# Patient Record
Sex: FEMALE | Race: WHITE | NOT HISPANIC OR LATINO | Employment: OTHER | URBAN - METROPOLITAN AREA
[De-identification: names, ages, dates, MRNs, and addresses within clinical notes are randomized per-mention and may not be internally consistent; named-entity substitution may affect disease eponyms.]

---

## 2019-03-04 ENCOUNTER — HISTORICAL (OUTPATIENT)
Dept: LAB | Facility: HOSPITAL | Age: 74
End: 2019-03-04

## 2019-03-04 LAB
ABS NEUT (OLG): 5.29 X10(3)/MCL (ref 2.1–9.2)
ALBUMIN SERPL-MCNC: 3.5 GM/DL (ref 3.4–5)
ALBUMIN/GLOB SERPL: 1.2 RATIO (ref 1.1–2)
ALP SERPL-CCNC: 94 UNIT/L (ref 38–126)
ALT SERPL-CCNC: 30 UNIT/L (ref 12–78)
AST SERPL-CCNC: 18 UNIT/L (ref 15–37)
BASOPHILS # BLD AUTO: 0 X10(3)/MCL (ref 0–0.2)
BASOPHILS NFR BLD AUTO: 0 %
BILIRUB SERPL-MCNC: 0.7 MG/DL (ref 0.2–1)
BILIRUBIN DIRECT+TOT PNL SERPL-MCNC: 0.1 MG/DL (ref 0–0.5)
BILIRUBIN DIRECT+TOT PNL SERPL-MCNC: 0.6 MG/DL (ref 0–0.8)
BUN SERPL-MCNC: 14 MG/DL (ref 7–18)
CALCIUM SERPL-MCNC: 9.1 MG/DL (ref 8.5–10.1)
CHLORIDE SERPL-SCNC: 108 MMOL/L (ref 98–107)
CHOLEST SERPL-MCNC: 183 MG/DL (ref 0–200)
CHOLEST/HDLC SERPL: 3.8 {RATIO} (ref 0–4)
CO2 SERPL-SCNC: 28 MMOL/L (ref 21–32)
CREAT SERPL-MCNC: 0.78 MG/DL (ref 0.55–1.02)
CREAT UR-MCNC: 115 MG/DL
DEPRECATED CALCIDIOL+CALCIFEROL SERPL-MC: 42 NG/ML (ref 30–80)
EOSINOPHIL # BLD AUTO: 0.4 X10(3)/MCL (ref 0–0.9)
EOSINOPHIL NFR BLD AUTO: 4 %
ERYTHROCYTE [DISTWIDTH] IN BLOOD BY AUTOMATED COUNT: 15.7 % (ref 11.5–17)
EST. AVERAGE GLUCOSE BLD GHB EST-MCNC: 137 MG/DL
GLOBULIN SER-MCNC: 3 GM/DL (ref 2.4–3.5)
GLUCOSE SERPL-MCNC: 119 MG/DL (ref 74–106)
HBA1C MFR BLD: 6.4 % (ref 4.2–6.3)
HCT VFR BLD AUTO: 43.8 % (ref 37–47)
HDLC SERPL-MCNC: 48 MG/DL (ref 35–60)
HGB BLD-MCNC: 13.3 GM/DL (ref 12–16)
LDLC SERPL CALC-MCNC: 109 MG/DL (ref 0–129)
LYMPHOCYTES # BLD AUTO: 1.9 X10(3)/MCL (ref 0.6–4.6)
LYMPHOCYTES NFR BLD AUTO: 23 %
MCH RBC QN AUTO: 28.6 PG (ref 27–31)
MCHC RBC AUTO-ENTMCNC: 30.4 GM/DL (ref 33–36)
MCV RBC AUTO: 94.2 FL (ref 80–94)
MICROALBUMIN UR-MCNC: 2.2 MG/DL
MICROALBUMIN/CREAT RATIO PNL UR: 19.1 MG/GM CR (ref 0–30)
MONOCYTES # BLD AUTO: 0.6 X10(3)/MCL (ref 0.1–1.3)
MONOCYTES NFR BLD AUTO: 7 %
NEUTROPHILS # BLD AUTO: 5.29 X10(3)/MCL (ref 2.1–9.2)
NEUTROPHILS NFR BLD AUTO: 65 %
PLATELET # BLD AUTO: 262 X10(3)/MCL (ref 130–400)
PMV BLD AUTO: 11.8 FL (ref 9.4–12.4)
POTASSIUM SERPL-SCNC: 3.9 MMOL/L (ref 3.5–5.1)
PROT SERPL-MCNC: 6.5 GM/DL (ref 6.4–8.2)
RBC # BLD AUTO: 4.65 X10(6)/MCL (ref 4.2–5.4)
SODIUM SERPL-SCNC: 143 MMOL/L (ref 136–145)
TRIGL SERPL-MCNC: 130 MG/DL (ref 30–150)
TSH SERPL-ACNC: 0.57 MIU/L (ref 0.36–3.74)
VLDLC SERPL CALC-MCNC: 26 MG/DL
WBC # SPEC AUTO: 8.2 X10(3)/MCL (ref 4.5–11.5)

## 2019-05-10 ENCOUNTER — HISTORICAL (OUTPATIENT)
Dept: RADIOLOGY | Facility: HOSPITAL | Age: 74
End: 2019-05-10

## 2019-06-03 ENCOUNTER — HISTORICAL (OUTPATIENT)
Dept: LAB | Facility: HOSPITAL | Age: 74
End: 2019-06-03

## 2019-06-03 LAB
ALBUMIN SERPL-MCNC: 3.7 GM/DL (ref 3.4–5)
ALBUMIN/GLOB SERPL: 1.2 {RATIO}
ALP SERPL-CCNC: 80 UNIT/L (ref 45–117)
ALT SERPL-CCNC: 16 UNIT/L (ref 13–56)
AST SERPL-CCNC: 12 UNIT/L (ref 15–37)
BILIRUB SERPL-MCNC: 0.5 MG/DL (ref 0.2–1)
BILIRUBIN DIRECT+TOT PNL SERPL-MCNC: 0.14 MG/DL (ref 0–0.2)
BILIRUBIN DIRECT+TOT PNL SERPL-MCNC: 0.36 MG/DL (ref 0–1)
BUN SERPL-MCNC: 14 MG/DL (ref 7–18)
CALCIUM SERPL-MCNC: 8.8 MG/DL (ref 8.5–10.1)
CHLORIDE SERPL-SCNC: 109 MMOL/L (ref 98–107)
CO2 SERPL-SCNC: 26 MMOL/L (ref 21–32)
CREAT SERPL-MCNC: 0.84 MG/DL (ref 0.55–1.02)
CREAT UR-MCNC: 172 MG/DL
EST. AVERAGE GLUCOSE BLD GHB EST-MCNC: 120 MG/DL
GLOBULIN SER-MCNC: 3 GM/DL (ref 2–4)
GLUCOSE SERPL-MCNC: 109 MG/DL (ref 74–106)
HBA1C MFR BLD: 5.8 % (ref 4.2–6.3)
MICROALBUMIN UR-MCNC: 1 MG/DL
MICROALBUMIN/CREAT RATIO PNL UR: 5.8 MG/GM CR (ref 0–30)
POTASSIUM SERPL-SCNC: 3.9 MMOL/L (ref 3.5–5.1)
PROT SERPL-MCNC: 6.7 GM/DL (ref 6.4–8.2)
SODIUM SERPL-SCNC: 143 MMOL/L (ref 136–145)
TSH SERPL-ACNC: 0.11 MIU/ML (ref 0.36–3.74)

## 2019-10-28 ENCOUNTER — HISTORICAL (OUTPATIENT)
Dept: LAB | Facility: HOSPITAL | Age: 74
End: 2019-10-28

## 2019-10-28 LAB
ABS NEUT (OLG): 5.63 X10(3)/MCL (ref 2.1–9.2)
ALBUMIN SERPL-MCNC: 3.8 GM/DL (ref 3.4–5)
ALBUMIN/GLOB SERPL: 1.2 RATIO (ref 1.1–2)
ALP SERPL-CCNC: 94 UNIT/L (ref 38–126)
ALT SERPL-CCNC: 15 UNIT/L (ref 12–78)
AST SERPL-CCNC: 14 UNIT/L (ref 15–37)
BASOPHILS # BLD AUTO: 0 X10(3)/MCL (ref 0–0.2)
BASOPHILS NFR BLD AUTO: 0 %
BILIRUB SERPL-MCNC: 0.6 MG/DL (ref 0.2–1)
BILIRUBIN DIRECT+TOT PNL SERPL-MCNC: 0.2 MG/DL (ref 0–0.5)
BILIRUBIN DIRECT+TOT PNL SERPL-MCNC: 0.4 MG/DL (ref 0–0.8)
BUN SERPL-MCNC: 13 MG/DL (ref 7–18)
CALCIUM SERPL-MCNC: 9.7 MG/DL (ref 8.5–10.1)
CHLORIDE SERPL-SCNC: 105 MMOL/L (ref 98–107)
CO2 SERPL-SCNC: 28 MMOL/L (ref 21–32)
CREAT SERPL-MCNC: 0.9 MG/DL (ref 0.55–1.02)
CREAT UR-MCNC: 130 MG/DL
DEPRECATED CALCIDIOL+CALCIFEROL SERPL-MC: 31.28 NG/ML (ref 30–80)
EOSINOPHIL # BLD AUTO: 0.4 X10(3)/MCL (ref 0–0.9)
EOSINOPHIL NFR BLD AUTO: 4 %
ERYTHROCYTE [DISTWIDTH] IN BLOOD BY AUTOMATED COUNT: 16.5 % (ref 11.5–17)
EST. AVERAGE GLUCOSE BLD GHB EST-MCNC: 128 MG/DL
GLOBULIN SER-MCNC: 3.2 GM/DL (ref 2.4–3.5)
GLUCOSE SERPL-MCNC: 127 MG/DL (ref 74–106)
HBA1C MFR BLD: 6.1 % (ref 4.2–6.3)
HCT VFR BLD AUTO: 48.2 % (ref 37–47)
HGB BLD-MCNC: 15 GM/DL (ref 12–16)
LYMPHOCYTES # BLD AUTO: 1.9 X10(3)/MCL (ref 0.6–4.6)
LYMPHOCYTES NFR BLD AUTO: 22 %
MCH RBC QN AUTO: 29 PG (ref 27–31)
MCHC RBC AUTO-ENTMCNC: 31.1 GM/DL (ref 33–36)
MCV RBC AUTO: 93.2 FL (ref 80–94)
MICROALBUMIN UR-MCNC: 1.1 MG/DL
MICROALBUMIN/CREAT RATIO PNL UR: 8.5 MG/GM CR (ref 0–30)
MONOCYTES # BLD AUTO: 0.7 X10(3)/MCL (ref 0.1–1.3)
MONOCYTES NFR BLD AUTO: 8 %
NEUTROPHILS # BLD AUTO: 5.63 X10(3)/MCL (ref 2.1–9.2)
NEUTROPHILS NFR BLD AUTO: 65 %
PLATELET # BLD AUTO: 304 X10(3)/MCL (ref 130–400)
PMV BLD AUTO: 11.9 FL (ref 9.4–12.4)
POTASSIUM SERPL-SCNC: 3.9 MMOL/L (ref 3.5–5.1)
PROT SERPL-MCNC: 7 GM/DL (ref 6.4–8.2)
RBC # BLD AUTO: 5.17 X10(6)/MCL (ref 4.2–5.4)
SODIUM SERPL-SCNC: 141 MMOL/L (ref 136–145)
T4 FREE SERPL-MCNC: 1.12 NG/DL (ref 0.76–1.46)
TSH SERPL-ACNC: 1.78 MIU/L (ref 0.36–3.74)
WBC # SPEC AUTO: 8.7 X10(3)/MCL (ref 4.5–11.5)

## 2019-11-21 ENCOUNTER — HISTORICAL (OUTPATIENT)
Dept: INFUSION THERAPY | Facility: HOSPITAL | Age: 74
End: 2019-11-21

## 2020-02-10 ENCOUNTER — HISTORICAL (OUTPATIENT)
Dept: LAB | Facility: HOSPITAL | Age: 75
End: 2020-02-10

## 2020-04-08 ENCOUNTER — HISTORICAL (OUTPATIENT)
Dept: LAB | Facility: HOSPITAL | Age: 75
End: 2020-04-08

## 2020-07-13 ENCOUNTER — NURSE TRIAGE (OUTPATIENT)
Dept: ADMINISTRATIVE | Facility: CLINIC | Age: 75
End: 2020-07-13

## 2020-07-13 NOTE — TELEPHONE ENCOUNTER
Pt's  states pt had rectal bleeding noted with BM Saturday that has now resolved.  states blood was noted in the stool.  denies bleeding turning water red.  advised per protocol and  verbalizes understanding.     Care Advice Given     Given By Given At Modified    Aditi Enrique RN 7/13/2020  7:47 AM No    GO TO ED/UCC NOW (OR TO OFFICE WITH PCP APPROVAL):                          * After using nurse judgment regarding the most appropriate site, tell the caller:  Go to ED.  Leave NOW.    * TRIAGER CAUTION: In selecting the most appropriate care site, you must consider both the severity of the patient's symptoms AND what resources are available at that care site.  * ED: Patients who may need surgery, sound seriously ill or may be unstable need to be sent to an ED. Likewise, so do most patients with complex medical problems and serious symptoms.  * UCC:  Some UCCs can manage patients who are stable and have less serious symptoms (e.g., minor illnesses and injuries). The triager must know the UCC capabilities before sending a patient there. If unsure, call ahead.  * OFFICE: If patient sounds stable and not seriously ill, consult PCP (or follow your office policy) to see if patient can be seen NOW in office.    Aditi Enrique RN 7/13/2020  7:49 AM No    CALL BACK IF:  * Bleeding increases in amount  * Bleeding occurs 3 or more times after treatment begins  * You become worse.      Disposition     Go to ED Now (or to Office With PCP Approval)         What would patient/caregiver have done without intervention? Would have attempted to contact the Provider    Patient/Caregiver understands and will follow disposition? Unsure      Protocols (Most recently selected listed first)     Name Status    RECTAL BLEEDING-A-OH Used            Reason for Disposition   Pale skin (pallor) of new onset or worsening    Additional Information   Negative: Passed out (i.e., fainted, collapsed  and was not responding)   Negative: Shock suspected (e.g., cold/pale/clammy skin, too weak to stand, low BP, rapid pulse)   Negative: Vomiting red blood or black (coffee ground) material   Negative: Sounds like a life-threatening emergency to the triager   Negative: SEVERE rectal bleeding (large blood clots; on and off, or constant bleeding)   Negative: SEVERE dizziness (e.g., unable to stand, requires support to walk, feels like passing out now)   Negative: MODERATE rectal bleeding (small blood clots, passing blood without stool, or toilet water turns red) more than once a day   Negative: Bloody, black, or tarry bowel movements   Negative: High-risk adult (e.g., prior surgery on aorta, abdominal aortic aneurysm)   Negative: Rectal foreign body (inserted or swallowed)   Negative: SEVERE abdominal pain (e.g., excruciating)   Negative: Constant abdominal pain lasting > 2 hours    Protocols used: RECTAL BLEEDING-A-OH

## 2020-08-18 ENCOUNTER — HISTORICAL (OUTPATIENT)
Dept: INFUSION THERAPY | Facility: HOSPITAL | Age: 75
End: 2020-08-18

## 2020-11-10 ENCOUNTER — HISTORICAL (OUTPATIENT)
Dept: ADMINISTRATIVE | Facility: HOSPITAL | Age: 75
End: 2020-11-10

## 2020-11-10 LAB
ABS NEUT (OLG): 4.58 X10(3)/MCL (ref 2.1–9.2)
BASOPHILS # BLD AUTO: 0 X10(3)/MCL (ref 0–0.2)
BASOPHILS NFR BLD AUTO: 0 %
EOSINOPHIL # BLD AUTO: 0.1 X10(3)/MCL (ref 0–0.9)
EOSINOPHIL NFR BLD AUTO: 2 %
ERYTHROCYTE [DISTWIDTH] IN BLOOD BY AUTOMATED COUNT: 14 % (ref 11.5–17)
HCT VFR BLD AUTO: 44.5 % (ref 37–47)
HGB BLD-MCNC: 14 GM/DL (ref 12–16)
LYMPHOCYTES # BLD AUTO: 1.6 X10(3)/MCL (ref 0.6–4.6)
LYMPHOCYTES NFR BLD AUTO: 24 %
MCH RBC QN AUTO: 31.5 PG (ref 27–31)
MCHC RBC AUTO-ENTMCNC: 31.5 GM/DL (ref 33–36)
MCV RBC AUTO: 100.2 FL (ref 80–94)
MONOCYTES # BLD AUTO: 0.5 X10(3)/MCL (ref 0.1–1.3)
MONOCYTES NFR BLD AUTO: 7 %
NEUTROPHILS # BLD AUTO: 4.58 X10(3)/MCL (ref 2.1–9.2)
NEUTROPHILS NFR BLD AUTO: 67 %
PLATELET # BLD AUTO: 233 X10(3)/MCL (ref 130–400)
PMV BLD AUTO: 12.6 FL (ref 9.4–12.4)
RBC # BLD AUTO: 4.44 X10(6)/MCL (ref 4.2–5.4)
T3FREE SERPL-MCNC: 2.62 PG/ML (ref 1.71–3.71)
T4 FREE SERPL-MCNC: 1.05 NG/DL (ref 0.7–1.48)
TSH SERPL-ACNC: 0.43 UIU/ML (ref 0.35–4.94)
WBC # SPEC AUTO: 6.9 X10(3)/MCL (ref 4.5–11.5)

## 2020-11-13 ENCOUNTER — HISTORICAL (OUTPATIENT)
Dept: ADMINISTRATIVE | Facility: HOSPITAL | Age: 75
End: 2020-11-13

## 2020-11-13 LAB
APPEARANCE, UA: CLEAR
BACTERIA SPEC CULT: ABNORMAL /HPF
BILIRUB UR QL STRIP: NEGATIVE
COLOR UR: YELLOW
GLUCOSE (UA): NEGATIVE
HGB UR QL STRIP: NEGATIVE
KETONES UR QL STRIP: NEGATIVE
LEUKOCYTE ESTERASE UR QL STRIP: ABNORMAL
NITRITE UR QL STRIP: NEGATIVE
PH UR STRIP: 5.5 [PH] (ref 5–9)
PROT UR QL STRIP: NEGATIVE
RBC #/AREA URNS HPF: ABNORMAL /[HPF]
SP GR UR STRIP: 1.02 (ref 1–1.03)
SQUAMOUS EPITHELIAL, UA: ABNORMAL
UROBILINOGEN UR STRIP-ACNC: 1
WBC #/AREA URNS HPF: 6 /HPF (ref 0–3)

## 2021-02-19 ENCOUNTER — HISTORICAL (OUTPATIENT)
Dept: INFUSION THERAPY | Facility: HOSPITAL | Age: 76
End: 2021-02-19

## 2021-03-23 ENCOUNTER — HISTORICAL (OUTPATIENT)
Dept: LAB | Facility: HOSPITAL | Age: 76
End: 2021-03-23

## 2021-03-23 LAB
ABS NEUT (OLG): 4.52 X10(3)/MCL (ref 2.1–9.2)
ALBUMIN SERPL-MCNC: 3.7 GM/DL (ref 3.4–4.8)
ALBUMIN/GLOB SERPL: 1.5 RATIO (ref 1.1–2)
ALP SERPL-CCNC: 49 UNIT/L (ref 40–150)
ALT SERPL-CCNC: 17 UNIT/L (ref 0–55)
AST SERPL-CCNC: 15 UNIT/L (ref 5–34)
BASOPHILS # BLD AUTO: 0.05 X10(3)/MCL (ref 0–0.2)
BASOPHILS NFR BLD AUTO: 0.6 % (ref 0–1)
BILIRUB SERPL-MCNC: 0.7 MG/DL (ref 0.2–1.2)
BILIRUBIN DIRECT+TOT PNL SERPL-MCNC: 0.3 MG/DL (ref 0–0.5)
BILIRUBIN DIRECT+TOT PNL SERPL-MCNC: 0.4 MG/DL (ref 0–0.8)
BUN SERPL-MCNC: 16.2 MG/DL (ref 9.8–20.1)
CALCIUM SERPL-MCNC: 8.6 MG/DL (ref 8.4–10.2)
CHLORIDE SERPL-SCNC: 108 MMOL/L (ref 98–107)
CHOLEST SERPL-MCNC: 136 MG/DL
CHOLEST/HDLC SERPL: 3 {RATIO} (ref 0–5)
CO2 SERPL-SCNC: 25 MMOL/L (ref 23–31)
CREAT SERPL-MCNC: 0.78 MG/DL (ref 0.57–1.11)
EOSINOPHIL # BLD AUTO: 0.32 X10(3)/MCL (ref 0–0.9)
EOSINOPHIL NFR BLD AUTO: 3.9 % (ref 0–6.4)
ERYTHROCYTE [DISTWIDTH] IN BLOOD BY AUTOMATED COUNT: 14.8 % (ref 11.5–17)
EST. AVERAGE GLUCOSE BLD GHB EST-MCNC: 122.6 MG/DL
GLOBULIN SER-MCNC: 2.5 GM/DL (ref 2.4–3.5)
GLUCOSE SERPL-MCNC: 122 MG/DL (ref 82–115)
HBA1C MFR BLD: 5.9 %
HCT VFR BLD AUTO: 41.5 % (ref 37–47)
HDLC SERPL-MCNC: 39 MG/DL (ref 40–60)
HGB BLD-MCNC: 13.2 GM/DL (ref 12–16)
IMM GRANULOCYTES # BLD AUTO: 0.03 10*3/UL (ref 0–0.02)
IMM GRANULOCYTES NFR BLD AUTO: 0.4 % (ref 0–0.43)
LDLC SERPL CALC-MCNC: 84 MG/DL (ref 50–140)
LYMPHOCYTES # BLD AUTO: 2.59 X10(3)/MCL (ref 0.6–4.6)
LYMPHOCYTES NFR BLD AUTO: 31.8 % (ref 16–44)
MCH RBC QN AUTO: 28.8 PG (ref 27–31)
MCHC RBC AUTO-ENTMCNC: 31.8 GM/DL (ref 33–36)
MCV RBC AUTO: 90.6 FL (ref 80–94)
MONOCYTES # BLD AUTO: 0.64 X10(3)/MCL (ref 0.1–1.3)
MONOCYTES NFR BLD AUTO: 7.9 % (ref 4–12.1)
NEUTROPHILS # BLD AUTO: 4.52 X10(3)/MCL (ref 2.1–9.2)
NEUTROPHILS NFR BLD AUTO: 55.4 % (ref 43–73)
NRBC BLD AUTO-RTO: 0 % (ref 0–0.2)
PLATELET # BLD AUTO: 243 X10(3)/MCL (ref 130–400)
PMV BLD AUTO: 12.2 FL (ref 7.4–10.4)
POTASSIUM SERPL-SCNC: 3.4 MMOL/L (ref 3.5–5.1)
PROT SERPL-MCNC: 6.2 GM/DL (ref 5.8–7.6)
RBC # BLD AUTO: 4.58 X10(6)/MCL (ref 4.2–5.4)
SODIUM SERPL-SCNC: 142 MMOL/L (ref 136–145)
TRIGL SERPL-MCNC: 63 MG/DL (ref 0–150)
TSH SERPL-ACNC: 0.05 UIU/ML (ref 0.35–4.94)
VLDLC SERPL CALC-MCNC: 13 MG/DL
WBC # SPEC AUTO: 8.2 X10(3)/MCL (ref 4.5–11.5)

## 2021-03-24 LAB
CREAT UR-MCNC: 125.4 MG/DL (ref 45–106)
MICROALBUMIN UR-MCNC: <5 UG/ML
MICROALBUMIN/CREAT RATIO PNL UR: <4 MG/GM CR (ref 0–30)

## 2021-07-01 LAB
LEFT EYE DM RETINOPATHY: NEGATIVE
RIGHT EYE DM RETINOPATHY: NEGATIVE

## 2021-10-16 ENCOUNTER — HISTORICAL (OUTPATIENT)
Dept: LAB | Facility: HOSPITAL | Age: 76
End: 2021-10-16

## 2021-10-16 LAB
ABS NEUT (OLG): 4.87 X10(3)/MCL (ref 2.1–9.2)
ALBUMIN SERPL-MCNC: 3.7 GM/DL (ref 3.4–4.8)
ALBUMIN/GLOB SERPL: 1.4 RATIO (ref 1.1–2)
ALP SERPL-CCNC: 56 UNIT/L (ref 40–150)
ALT SERPL-CCNC: 15 UNIT/L (ref 0–55)
AST SERPL-CCNC: 17 UNIT/L (ref 5–34)
BASOPHILS # BLD AUTO: 0.03 X10(3)/MCL (ref 0–0.2)
BASOPHILS NFR BLD AUTO: 0.4 % (ref 0–1)
BILIRUB SERPL-MCNC: 0.9 MG/DL (ref 0.2–1.2)
BILIRUBIN DIRECT+TOT PNL SERPL-MCNC: 0.3 MG/DL (ref 0–0.5)
BILIRUBIN DIRECT+TOT PNL SERPL-MCNC: 0.6 MG/DL (ref 0–0.8)
BUN SERPL-MCNC: 9 MG/DL (ref 9.8–20.1)
CALCIUM SERPL-MCNC: 9.4 MG/DL (ref 8.4–10.2)
CHLORIDE SERPL-SCNC: 106 MMOL/L (ref 98–107)
CHOLEST SERPL-MCNC: 138 MG/DL
CHOLEST/HDLC SERPL: 3 {RATIO} (ref 0–5)
CO2 SERPL-SCNC: 26 MMOL/L (ref 23–31)
CREAT SERPL-MCNC: 0.74 MG/DL (ref 0.57–1.11)
CREAT UR-MCNC: 94.5 MG/DL (ref 45–106)
EOSINOPHIL # BLD AUTO: 0.22 X10(3)/MCL (ref 0–0.9)
EOSINOPHIL NFR BLD AUTO: 2.9 % (ref 0–6.4)
ERYTHROCYTE [DISTWIDTH] IN BLOOD BY AUTOMATED COUNT: 16.9 % (ref 11.5–17)
EST. AVERAGE GLUCOSE BLD GHB EST-MCNC: 116.9 MG/DL
GLOBULIN SER-MCNC: 2.7 GM/DL (ref 2.4–3.5)
GLUCOSE SERPL-MCNC: 129 MG/DL (ref 82–115)
HBA1C MFR BLD: 5.7 %
HCT VFR BLD AUTO: 43.3 % (ref 37–47)
HDLC SERPL-MCNC: 42 MG/DL (ref 40–60)
HGB BLD-MCNC: 13.6 GM/DL (ref 12–16)
IMM GRANULOCYTES # BLD AUTO: 0.03 10*3/UL (ref 0–0.02)
IMM GRANULOCYTES NFR BLD AUTO: 0.4 % (ref 0–0.43)
LDLC SERPL CALC-MCNC: 80 MG/DL (ref 50–140)
LYMPHOCYTES # BLD AUTO: 1.93 X10(3)/MCL (ref 0.6–4.6)
LYMPHOCYTES NFR BLD AUTO: 25.3 % (ref 16–44)
MCH RBC QN AUTO: 28.6 PG (ref 27–31)
MCHC RBC AUTO-ENTMCNC: 31.4 GM/DL (ref 33–36)
MCV RBC AUTO: 91 FL (ref 80–94)
MICROALBUMIN UR-MCNC: 9 UG/ML
MICROALBUMIN/CREAT RATIO PNL UR: 9.5 MG/GM CR (ref 0–30)
MONOCYTES # BLD AUTO: 0.54 X10(3)/MCL (ref 0.1–1.3)
MONOCYTES NFR BLD AUTO: 7.1 % (ref 4–12.1)
NEUTROPHILS # BLD AUTO: 4.87 X10(3)/MCL (ref 2.1–9.2)
NEUTROPHILS NFR BLD AUTO: 63.9 % (ref 43–73)
NRBC BLD AUTO-RTO: 0 % (ref 0–0.2)
PLATELET # BLD AUTO: 224 X10(3)/MCL (ref 130–400)
PMV BLD AUTO: 12.3 FL (ref 7.4–10.4)
POTASSIUM SERPL-SCNC: 4.2 MMOL/L (ref 3.5–5.1)
PROT SERPL-MCNC: 6.4 GM/DL (ref 5.8–7.6)
RBC # BLD AUTO: 4.76 X10(6)/MCL (ref 4.2–5.4)
SODIUM SERPL-SCNC: 143 MMOL/L (ref 136–145)
TRIGL SERPL-MCNC: 78 MG/DL (ref 0–150)
TSH SERPL-ACNC: 0.06 UIU/ML (ref 0.35–4.94)
VLDLC SERPL CALC-MCNC: 16 MG/DL
WBC # SPEC AUTO: 7.6 X10(3)/MCL (ref 4.5–11.5)

## 2021-10-25 ENCOUNTER — HISTORICAL (OUTPATIENT)
Dept: RADIOLOGY | Facility: HOSPITAL | Age: 76
End: 2021-10-25

## 2022-04-05 DIAGNOSIS — M81.0 AGE-RELATED OSTEOPOROSIS WITHOUT CURRENT PATHOLOGICAL FRACTURE: ICD-10-CM

## 2022-04-09 ENCOUNTER — HISTORICAL (OUTPATIENT)
Dept: ADMINISTRATIVE | Facility: HOSPITAL | Age: 77
End: 2022-04-09
Payer: MEDICARE

## 2022-04-26 VITALS
HEIGHT: 61 IN | BODY MASS INDEX: 31.51 KG/M2 | WEIGHT: 166.88 LBS | OXYGEN SATURATION: 98 % | DIASTOLIC BLOOD PRESSURE: 73 MMHG | SYSTOLIC BLOOD PRESSURE: 117 MMHG

## 2022-05-17 NOTE — TELEPHONE ENCOUNTER
Type:  RX Refill Request    Who Called: pt   Refill or New Rx:refill  RX Name and Strength:wellburtion 150mg  How is the patient currently taking it? (ex. 1XDay):1xday  Is this a 30 day or 90 day RX:90day  Preferred Pharmacy with phone number:[facility]  Local or Mail Order:local  Ordering Provider:na  Would the patient rather a call back or a response via [facility]? Call back  Best Call Back Number:[phone]  Additional Information:

## 2022-05-19 RX ORDER — BUPROPION HYDROCHLORIDE 150 MG/1
150 TABLET, EXTENDED RELEASE ORAL 2 TIMES DAILY
COMMUNITY
End: 2022-05-19 | Stop reason: SDUPTHER

## 2022-05-19 RX ORDER — BUPROPION HYDROCHLORIDE 150 MG/1
150 TABLET, EXTENDED RELEASE ORAL DAILY
Qty: 90 TABLET | Refills: 0 | Status: SHIPPED | OUTPATIENT
Start: 2022-05-19 | End: 2022-10-06

## 2022-09-29 ENCOUNTER — TELEPHONE (OUTPATIENT)
Dept: FAMILY MEDICINE | Facility: CLINIC | Age: 77
End: 2022-09-29
Payer: MEDICARE

## 2022-09-29 DIAGNOSIS — E11.65 TYPE 2 DIABETES MELLITUS WITH HYPERGLYCEMIA, WITHOUT LONG-TERM CURRENT USE OF INSULIN: ICD-10-CM

## 2022-09-29 DIAGNOSIS — Z00.00 WELLNESS EXAMINATION: Primary | ICD-10-CM

## 2022-09-29 DIAGNOSIS — Z11.59 ENCOUNTER FOR HEPATITIS C SCREENING TEST FOR LOW RISK PATIENT: ICD-10-CM

## 2022-09-29 DIAGNOSIS — E03.9 HYPOTHYROIDISM, UNSPECIFIED TYPE: ICD-10-CM

## 2022-09-29 DIAGNOSIS — E78.5 HYPERLIPIDEMIA, UNSPECIFIED HYPERLIPIDEMIA TYPE: ICD-10-CM

## 2022-09-29 NOTE — TELEPHONE ENCOUNTER
I have signed for the following orders AND/OR meds. Please notify the patient and ask the patient to schedule the testing and/or information about any medications that were sent.         Orders Placed This Encounter   Procedures    Comprehensive Metabolic Panel     Standing Status:   Future     Standing Expiration Date:   9/29/2023    Lipid Panel     Standing Status:   Future     Standing Expiration Date:   9/29/2023    CBC Auto Differential     Standing Status:   Future     Standing Expiration Date:   9/29/2023    Hemoglobin A1C     Standing Status:   Future     Standing Expiration Date:   9/29/2023    TSH     Standing Status:   Future     Standing Expiration Date:   9/29/2023    Hepatitis C Antibody     Standing Status:   Future     Standing Expiration Date:   9/29/2023     Order Specific Question:   Release to patient     Answer:   Immediate    Microalbumin/Creatinine Ratio, Urine     Standing Status:   Future     Standing Expiration Date:   9/29/2023     Order Specific Question:   Specimen Source     Answer:   Urine

## 2022-09-29 NOTE — TELEPHONE ENCOUNTER
----- Message from Christie Pastor sent at 9/29/2022  8:41 AM CDT -----  Regarding: Lab Orders  .Type:  Needs Medical Advice    Who Called: Pt's   Symptoms (please be specific):    How long has patient had these symptoms:    Pharmacy name and phone #:    Would the patient rather a call back or a response via [facility]? Call back  Best Call Back Number: [phone]  Additional Information: Requesting lab orders for appt on 10/6, would like a call back when labs are out in.

## 2022-10-04 ENCOUNTER — LAB VISIT (OUTPATIENT)
Dept: LAB | Facility: HOSPITAL | Age: 77
End: 2022-10-04
Attending: INTERNAL MEDICINE
Payer: MEDICARE

## 2022-10-04 DIAGNOSIS — E03.9 HYPOTHYROIDISM, UNSPECIFIED TYPE: ICD-10-CM

## 2022-10-04 DIAGNOSIS — Z11.59 ENCOUNTER FOR HEPATITIS C SCREENING TEST FOR LOW RISK PATIENT: ICD-10-CM

## 2022-10-04 DIAGNOSIS — Z00.00 WELLNESS EXAMINATION: ICD-10-CM

## 2022-10-04 DIAGNOSIS — E78.5 HYPERLIPIDEMIA, UNSPECIFIED HYPERLIPIDEMIA TYPE: ICD-10-CM

## 2022-10-04 DIAGNOSIS — E11.65 TYPE 2 DIABETES MELLITUS WITH HYPERGLYCEMIA, WITHOUT LONG-TERM CURRENT USE OF INSULIN: ICD-10-CM

## 2022-10-04 LAB
ALBUMIN SERPL-MCNC: 3.8 GM/DL (ref 3.4–4.8)
ALBUMIN/GLOB SERPL: 1.4 RATIO (ref 1.1–2)
ALP SERPL-CCNC: 85 UNIT/L (ref 40–150)
ALT SERPL-CCNC: 13 UNIT/L (ref 0–55)
AST SERPL-CCNC: 15 UNIT/L (ref 5–34)
BASOPHILS # BLD AUTO: 0.04 X10(3)/MCL (ref 0–0.2)
BASOPHILS NFR BLD AUTO: 0.4 %
BILIRUBIN DIRECT+TOT PNL SERPL-MCNC: 1.1 MG/DL
BUN SERPL-MCNC: 16.3 MG/DL (ref 9.8–20.1)
CALCIUM SERPL-MCNC: 10 MG/DL (ref 8.4–10.2)
CHLORIDE SERPL-SCNC: 105 MMOL/L (ref 98–107)
CHOLEST SERPL-MCNC: 153 MG/DL
CHOLEST/HDLC SERPL: 4 {RATIO} (ref 0–5)
CO2 SERPL-SCNC: 26 MMOL/L (ref 23–31)
CREAT SERPL-MCNC: 0.92 MG/DL (ref 0.55–1.02)
CREAT UR-MCNC: 108.6 MG/DL (ref 47–110)
EOSINOPHIL # BLD AUTO: 0.11 X10(3)/MCL (ref 0–0.9)
EOSINOPHIL NFR BLD AUTO: 1 %
ERYTHROCYTE [DISTWIDTH] IN BLOOD BY AUTOMATED COUNT: 14.3 % (ref 11.5–17)
EST. AVERAGE GLUCOSE BLD GHB EST-MCNC: 122.6 MG/DL
GFR SERPLBLD CREATININE-BSD FMLA CKD-EPI: >60 MLS/MIN/1.73/M2
GLOBULIN SER-MCNC: 2.7 GM/DL (ref 2.4–3.5)
GLUCOSE SERPL-MCNC: 126 MG/DL (ref 82–115)
HBA1C MFR BLD: 5.9 %
HCT VFR BLD AUTO: 45.2 % (ref 37–47)
HCV AB SERPL QL IA: NONREACTIVE
HDLC SERPL-MCNC: 36 MG/DL (ref 35–60)
HGB BLD-MCNC: 15 GM/DL (ref 12–16)
IMM GRANULOCYTES # BLD AUTO: 0.04 X10(3)/MCL (ref 0–0.04)
IMM GRANULOCYTES NFR BLD AUTO: 0.4 %
LDLC SERPL CALC-MCNC: 100 MG/DL (ref 50–140)
LYMPHOCYTES # BLD AUTO: 1.57 X10(3)/MCL (ref 0.6–4.6)
LYMPHOCYTES NFR BLD AUTO: 15 %
MCH RBC QN AUTO: 30.4 PG (ref 27–31)
MCHC RBC AUTO-ENTMCNC: 33.2 MG/DL (ref 33–36)
MCV RBC AUTO: 91.5 FL (ref 80–94)
MICROALBUMIN UR-MCNC: 395.7 UG/ML
MICROALBUMIN/CREAT RATIO PNL UR: 364.4 MG/GM CR (ref 0–30)
MONOCYTES # BLD AUTO: 0.62 X10(3)/MCL (ref 0.1–1.3)
MONOCYTES NFR BLD AUTO: 5.9 %
NEUTROPHILS # BLD AUTO: 8.1 X10(3)/MCL (ref 2.1–9.2)
NEUTROPHILS NFR BLD AUTO: 77.3 %
NRBC BLD AUTO-RTO: 0 %
PLATELET # BLD AUTO: 229 X10(3)/MCL (ref 130–400)
PMV BLD AUTO: 12 FL (ref 7.4–10.4)
POTASSIUM SERPL-SCNC: 3.9 MMOL/L (ref 3.5–5.1)
PROT SERPL-MCNC: 6.5 GM/DL (ref 5.8–7.6)
RBC # BLD AUTO: 4.94 X10(6)/MCL (ref 4.2–5.4)
SODIUM SERPL-SCNC: 143 MMOL/L (ref 136–145)
TRIGL SERPL-MCNC: 83 MG/DL (ref 37–140)
TSH SERPL-ACNC: 0.79 UIU/ML (ref 0.35–4.94)
VLDLC SERPL CALC-MCNC: 17 MG/DL
WBC # SPEC AUTO: 10.5 X10(3)/MCL (ref 4.5–11.5)

## 2022-10-04 PROCEDURE — 82043 UR ALBUMIN QUANTITATIVE: CPT

## 2022-10-04 PROCEDURE — 36415 COLL VENOUS BLD VENIPUNCTURE: CPT

## 2022-10-04 PROCEDURE — 86803 HEPATITIS C AB TEST: CPT

## 2022-10-04 PROCEDURE — 80053 COMPREHEN METABOLIC PANEL: CPT

## 2022-10-04 PROCEDURE — 85025 COMPLETE CBC W/AUTO DIFF WBC: CPT

## 2022-10-04 PROCEDURE — 83036 HEMOGLOBIN GLYCOSYLATED A1C: CPT

## 2022-10-04 PROCEDURE — 84443 ASSAY THYROID STIM HORMONE: CPT

## 2022-10-04 PROCEDURE — 80061 LIPID PANEL: CPT

## 2022-10-06 ENCOUNTER — OFFICE VISIT (OUTPATIENT)
Dept: FAMILY MEDICINE | Facility: CLINIC | Age: 77
End: 2022-10-06
Payer: MEDICARE

## 2022-10-06 VITALS
TEMPERATURE: 97 F | HEIGHT: 61 IN | SYSTOLIC BLOOD PRESSURE: 115 MMHG | BODY MASS INDEX: 31.53 KG/M2 | OXYGEN SATURATION: 98 % | HEART RATE: 86 BPM | DIASTOLIC BLOOD PRESSURE: 76 MMHG

## 2022-10-06 DIAGNOSIS — E78.5 HYPERLIPIDEMIA ASSOCIATED WITH TYPE 2 DIABETES MELLITUS: ICD-10-CM

## 2022-10-06 DIAGNOSIS — Z99.89 USES WALKER: ICD-10-CM

## 2022-10-06 DIAGNOSIS — E11.22 STAGE 3 CHRONIC KIDNEY DISEASE DUE TO TYPE 2 DIABETES MELLITUS: ICD-10-CM

## 2022-10-06 DIAGNOSIS — E11.59 HYPERTENSION ASSOCIATED WITH TYPE 2 DIABETES MELLITUS: ICD-10-CM

## 2022-10-06 DIAGNOSIS — E11.8 TYPE 2 DIABETES WITH COMPLICATION: Primary | ICD-10-CM

## 2022-10-06 DIAGNOSIS — Z00.00 WELLNESS EXAMINATION: ICD-10-CM

## 2022-10-06 DIAGNOSIS — E11.69 HYPERLIPIDEMIA ASSOCIATED WITH TYPE 2 DIABETES MELLITUS: ICD-10-CM

## 2022-10-06 DIAGNOSIS — M81.0 AGE-RELATED OSTEOPOROSIS WITHOUT CURRENT PATHOLOGICAL FRACTURE: ICD-10-CM

## 2022-10-06 DIAGNOSIS — E03.9 HYPOTHYROIDISM, UNSPECIFIED TYPE: ICD-10-CM

## 2022-10-06 DIAGNOSIS — N18.30 STAGE 3 CHRONIC KIDNEY DISEASE DUE TO TYPE 2 DIABETES MELLITUS: ICD-10-CM

## 2022-10-06 DIAGNOSIS — I15.2 HYPERTENSION ASSOCIATED WITH TYPE 2 DIABETES MELLITUS: ICD-10-CM

## 2022-10-06 DIAGNOSIS — Z23 NEED FOR VACCINATION: ICD-10-CM

## 2022-10-06 PROBLEM — E11.9 TYPE 2 DIABETES MELLITUS: Status: ACTIVE | Noted: 2022-10-06

## 2022-10-06 PROBLEM — N32.81 OVERACTIVE BLADDER: Status: ACTIVE | Noted: 2022-10-06

## 2022-10-06 PROBLEM — N39.0 RECURRENT UTI (URINARY TRACT INFECTION): Status: ACTIVE | Noted: 2022-10-06

## 2022-10-06 PROBLEM — F03.90 DEMENTIA: Status: ACTIVE | Noted: 2022-10-06

## 2022-10-06 PROBLEM — I65.23 BILATERAL CAROTID ARTERY STENOSIS: Status: ACTIVE | Noted: 2022-10-06

## 2022-10-06 PROCEDURE — 99214 PR OFFICE/OUTPT VISIT, EST, LEVL IV, 30-39 MIN: ICD-10-PCS | Mod: ,,, | Performed by: INTERNAL MEDICINE

## 2022-10-06 PROCEDURE — G0008 ADMIN INFLUENZA VIRUS VAC: HCPCS | Mod: ,,, | Performed by: INTERNAL MEDICINE

## 2022-10-06 PROCEDURE — G0008 FLU VACCINE - QUADRIVALENT - ADJUVANTED: ICD-10-PCS | Mod: ,,, | Performed by: INTERNAL MEDICINE

## 2022-10-06 PROCEDURE — 99214 OFFICE O/P EST MOD 30 MIN: CPT | Mod: ,,, | Performed by: INTERNAL MEDICINE

## 2022-10-06 PROCEDURE — 90694 FLU VACCINE - QUADRIVALENT - ADJUVANTED: ICD-10-PCS | Mod: ,,, | Performed by: INTERNAL MEDICINE

## 2022-10-06 PROCEDURE — 90694 VACC AIIV4 NO PRSRV 0.5ML IM: CPT | Mod: ,,, | Performed by: INTERNAL MEDICINE

## 2022-10-06 RX ORDER — LEVOTHYROXINE SODIUM 50 UG/1
50 TABLET ORAL DAILY
COMMUNITY
Start: 2022-10-01

## 2022-10-06 RX ORDER — LISINOPRIL 2.5 MG/1
2.5 TABLET ORAL DAILY
Qty: 90 TABLET | Refills: 3 | Status: SHIPPED | OUTPATIENT
Start: 2022-10-06 | End: 2023-11-09

## 2022-10-06 RX ORDER — NYSTATIN 100000 U/G
OINTMENT TOPICAL 2 TIMES DAILY
Qty: 30 G | Refills: 3 | Status: SHIPPED | OUTPATIENT
Start: 2022-10-06 | End: 2023-10-30

## 2022-10-06 RX ORDER — CHOLECALCIFEROL (VITAMIN D3) 25 MCG
1000 TABLET ORAL DAILY
COMMUNITY
End: 2022-12-12

## 2022-10-06 NOTE — PROGRESS NOTES
Subjective:      Patient ID: Miladys Smith is a 77 y.o. female.    Chief Complaint: Follow-up (6 Month Follow-Up with completed labs )    HPI    Last wellness 4/6/2022  With daughter today  No new issues      chronic medical conditions:  Depression, Dementia:  no change in sx  compliant with namenda  Type 2 diabetes:  A1c 5.9  not checking home BG  Diabetic foot exam completed 04/07/2021 ; no DN  Patient sees Dr. 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 and no treatment for this, has cataracts in the R eye, yearly follow up with them  current med: none  on lisinopril 2.5 mg PO QD  on statin  BP is at goal  Hyperlipidemia: Compliant with statin therapies, no myalgias  Hypothyroidism: Compliant with medication, no hot or cold intolerance, no palpitations not on levothyroxine  Osteoporosis: on calcium + vitamin D, missed August dose of Prolia  Scheduled 10/24/2022   CKD 3:  no nsaid use  overdue labs    Mammogram: she declines  Colon cancer screening: declines  DEXA: 10/25/2021 osteoporosis- Prolia via infusion center  PNEUMOVAX: completed 3/18/19  PREVNAR 5/1/2012  high dose influenza vaccine: 10/06/2022   advance directives: none  HCPOA: need records  no glasses, regular eye exam yearly  hearing test: passed whisper test  COVID 19 vaccine: completed [employer]  *reminded patient to get her booster   Health Maintenance Due   Topic Date Due    TETANUS VACCINE  Never done    Shingles Vaccine (2 of 3) 10/15/2013    COVID-19 Vaccine (2 - Booster for [place] series) 06/03/2021     Review of Systems   Constitutional:  Negative for chills and fever.   HENT:  Negative for congestion, sinus pressure and sore throat.    Respiratory:  Negative for cough and shortness of breath.    Cardiovascular:  Negative for chest pain and palpitations.   Gastrointestinal:  Negative for abdominal pain and nausea.   Skin:  Negative for rash.   A comprehensive ROS was performed  "and is negative except as noted above.  Objective:     Vitals:    10/06/22 1130   BP: 115/76   BP Location: Left arm   Patient Position: Sitting   BP Method: Large (Automatic)   Pulse: 86   Temp: 97.3 °F (36.3 °C)   TempSrc: Temporal   SpO2: 98%   Height: 5' 1" (1.549 m)     Physical Exam  Vitals and nursing note reviewed.   Constitutional:       General: She is not in acute distress.     Appearance: She is well-developed. She is not diaphoretic.   HENT:      Head: Normocephalic and atraumatic.      Right Ear: Tympanic membrane normal.      Left Ear: Tympanic membrane normal.      Nose: Nose normal.      Mouth/Throat:      Pharynx: No oropharyngeal exudate.   Eyes:      General:         Right eye: No discharge.         Left eye: No discharge.      Conjunctiva/sclera: Conjunctivae normal.      Pupils: Pupils are equal, round, and reactive to light.   Neck:      Thyroid: No thyromegaly.   Cardiovascular:      Rate and Rhythm: Normal rate and regular rhythm.      Heart sounds: Normal heart sounds. No murmur heard.  Pulmonary:      Effort: Pulmonary effort is normal. No respiratory distress.      Breath sounds: Normal breath sounds. No wheezing or rales.   Abdominal:      General: There is no distension.      Palpations: Abdomen is soft.      Tenderness: There is no abdominal tenderness.   Musculoskeletal:      Cervical back: Normal range of motion and neck supple.   Lymphadenopathy:      Cervical: No cervical adenopathy.   Skin:     General: Skin is warm and dry.   Neurological:      Mental Status: She is alert and oriented to person, place, and time.   Psychiatric:         Mood and Affect: Mood normal.         Behavior: Behavior normal.   Protective Sensation (w/ 10 gram monofilament):  Right: Intact  Left: Intact    Visual Inspection:  Normal -  Bilateral  Patient does have thickened toenails with yellow discoloration to both great toe nails- she is under OTC treatment for this with topical antifungal   Pedal Pulses: "   Right: Present  Left: Present    Posterior tibialis:   Right:Present  Left: Present    Assessment/Plan:     1. Type 2 diabetes with complication  -     Comprehensive Metabolic Panel; Future; Expected date: 04/06/2023  -     Lipid Panel; Future; Expected date: 04/06/2023  -     CBC Auto Differential; Future; Expected date: 04/06/2023  -     Hemoglobin A1C; Future; Expected date: 04/06/2023  -     Microalbumin/Creatinine Ratio, Urine; Future; Expected date: 04/06/2023  -     TSH; Future; Expected date: 04/06/2023  A1c stable but urine protein is high  Restart ACEi low dose  If remains high at next OV, will consider nephrotic syndrome work up  Request DM eye exam report  2. Stage 3 chronic kidney disease due to type 2 diabetes mellitus  -     Comprehensive Metabolic Panel; Future; Expected date: 04/06/2023  -     Lipid Panel; Future; Expected date: 04/06/2023  -     CBC Auto Differential; Future; Expected date: 04/06/2023  -     Hemoglobin A1C; Future; Expected date: 04/06/2023  -     Microalbumin/Creatinine Ratio, Urine; Future; Expected date: 04/06/2023  -     TSH; Future; Expected date: 04/06/2023  Stable, restart low dose ACEi  3. Hyperlipidemia associated with type 2 diabetes mellitus  -     Comprehensive Metabolic Panel; Future; Expected date: 04/06/2023  -     Lipid Panel; Future; Expected date: 04/06/2023  -     CBC Auto Differential; Future; Expected date: 04/06/2023  -     Hemoglobin A1C; Future; Expected date: 04/06/2023  -     Microalbumin/Creatinine Ratio, Urine; Future; Expected date: 04/06/2023  -     TSH; Future; Expected date: 04/06/2023  Stable, continue statin  4. Hypertension associated with type 2 diabetes mellitus  -     Comprehensive Metabolic Panel; Future; Expected date: 04/06/2023  -     Lipid Panel; Future; Expected date: 04/06/2023  -     CBC Auto Differential; Future; Expected date: 04/06/2023  -     Hemoglobin A1C; Future; Expected date: 04/06/2023  -     Microalbumin/Creatinine Ratio,  Urine; Future; Expected date: 04/06/2023  -     TSH; Future; Expected date: 04/06/2023  BP controlled  Restart ACEi  5. Age-related osteoporosis without current pathological fracture  -     Comprehensive Metabolic Panel; Future; Expected date: 04/06/2023  -     Lipid Panel; Future; Expected date: 04/06/2023  -     CBC Auto Differential; Future; Expected date: 04/06/2023  -     Hemoglobin A1C; Future; Expected date: 04/06/2023  -     Microalbumin/Creatinine Ratio, Urine; Future; Expected date: 04/06/2023  -     TSH; Future; Expected date: 04/06/2023  Prolia scheduled later this month  6. Wellness examination  -     Comprehensive Metabolic Panel; Future; Expected date: 04/06/2023  -     Lipid Panel; Future; Expected date: 04/06/2023  -     CBC Auto Differential; Future; Expected date: 04/06/2023  -     Hemoglobin A1C; Future; Expected date: 04/06/2023  -     Microalbumin/Creatinine Ratio, Urine; Future; Expected date: 04/06/2023  -     TSH; Future; Expected date: 04/06/2023  Fasting labs next year for wellness   7. Hypothyroidism, unspecified type  -     Comprehensive Metabolic Panel; Future; Expected date: 04/06/2023  -     Lipid Panel; Future; Expected date: 04/06/2023  -     CBC Auto Differential; Future; Expected date: 04/06/2023  -     Hemoglobin A1C; Future; Expected date: 04/06/2023  -     Microalbumin/Creatinine Ratio, Urine; Future; Expected date: 04/06/2023  -     TSH; Future; Expected date: 04/06/2023    8. Need for vaccination  -     Influenza (FLUAD) - Quadrivalent (Adjuvanted) *Preferred* (65+) (PF)    9. Uses walker    Other orders  -     lisinopriL (PRINIVIL,ZESTRIL) 2.5 MG tablet; Take 1 tablet (2.5 mg total) by mouth once daily.  Dispense: 90 tablet; Refill: 3     Follow up in about 6 months (around 4/7/2023) for Annual Wellness labs due before .

## 2022-10-24 ENCOUNTER — INFUSION (OUTPATIENT)
Dept: INFUSION THERAPY | Facility: HOSPITAL | Age: 77
End: 2022-10-24
Attending: INTERNAL MEDICINE
Payer: MEDICARE

## 2022-10-24 VITALS
TEMPERATURE: 99 F | DIASTOLIC BLOOD PRESSURE: 77 MMHG | OXYGEN SATURATION: 96 % | SYSTOLIC BLOOD PRESSURE: 119 MMHG | RESPIRATION RATE: 16 BRPM | HEART RATE: 89 BPM

## 2022-10-24 DIAGNOSIS — M81.0 AGE-RELATED OSTEOPOROSIS WITHOUT CURRENT PATHOLOGICAL FRACTURE: Primary | ICD-10-CM

## 2022-10-24 PROCEDURE — 63600175 PHARM REV CODE 636 W HCPCS: Mod: JG | Performed by: INTERNAL MEDICINE

## 2022-10-24 PROCEDURE — 96372 THER/PROPH/DIAG INJ SC/IM: CPT

## 2022-10-24 RX ADMIN — DENOSUMAB 60 MG: 60 INJECTION SUBCUTANEOUS at 01:10

## 2022-10-24 NOTE — PLAN OF CARE
Pt tolerated injection well. Next appt reviewed with pt and spouse. Pt denied questions or further needs at the time of discharge.

## 2022-12-12 ENCOUNTER — OFFICE VISIT (OUTPATIENT)
Dept: FAMILY MEDICINE | Facility: CLINIC | Age: 77
End: 2022-12-12
Payer: MEDICARE

## 2022-12-12 VITALS
SYSTOLIC BLOOD PRESSURE: 108 MMHG | WEIGHT: 172.19 LBS | RESPIRATION RATE: 16 BRPM | TEMPERATURE: 97 F | HEIGHT: 61 IN | HEART RATE: 69 BPM | OXYGEN SATURATION: 98 % | BODY MASS INDEX: 32.51 KG/M2 | DIASTOLIC BLOOD PRESSURE: 69 MMHG

## 2022-12-12 DIAGNOSIS — R35.0 URINARY FREQUENCY: Primary | ICD-10-CM

## 2022-12-12 LAB
APPEARANCE UR: ABNORMAL
BACTERIA #/AREA URNS AUTO: ABNORMAL /HPF
BILIRUB UR QL STRIP.AUTO: ABNORMAL MG/DL
COLOR UR AUTO: ABNORMAL
GLUCOSE UR QL STRIP.AUTO: NEGATIVE MG/DL
KETONES UR QL STRIP.AUTO: ABNORMAL MG/DL
LEUKOCYTE ESTERASE UR QL STRIP.AUTO: ABNORMAL UNIT/L
NITRITE UR QL STRIP.AUTO: POSITIVE
PH UR STRIP.AUTO: 5.5 [PH]
PROT UR QL STRIP.AUTO: ABNORMAL MG/DL
RBC #/AREA URNS AUTO: ABNORMAL /HPF
RBC UR QL AUTO: NEGATIVE UNIT/L
SP GR UR STRIP.AUTO: 1.02 (ref 1–1.03)
SQUAMOUS #/AREA URNS AUTO: ABNORMAL /HPF
UROBILINOGEN UR STRIP-ACNC: 2 MG/DL
WBC #/AREA URNS AUTO: ABNORMAL /HPF

## 2022-12-12 PROCEDURE — 81001 URINALYSIS AUTO W/SCOPE: CPT | Performed by: NURSE PRACTITIONER

## 2022-12-12 PROCEDURE — 99213 OFFICE O/P EST LOW 20 MIN: CPT | Mod: ,,, | Performed by: NURSE PRACTITIONER

## 2022-12-12 PROCEDURE — 99213 PR OFFICE/OUTPT VISIT, EST, LEVL III, 20-29 MIN: ICD-10-PCS | Mod: ,,, | Performed by: NURSE PRACTITIONER

## 2022-12-12 PROCEDURE — 87186 SC STD MICRODIL/AGAR DIL: CPT | Performed by: NURSE PRACTITIONER

## 2022-12-12 RX ORDER — METFORMIN HYDROCHLORIDE 500 MG/1
500 TABLET ORAL DAILY
COMMUNITY

## 2022-12-12 NOTE — PROGRESS NOTES
Subjective:      Patient ID: Miladys Smith is a 77 y.o. White female      Chief Complaint: Follow-up (Urinary frequency, urinary urgency, and burning when urinating. x1 month )       Past Medical History:   Diagnosis Date    Constipation due to pain medication 8/23/2013    Formatting of this note might be different from the original. After surgery    Dementia 10/6/2022    Hyperlipidemia associated with type 2 diabetes mellitus 8/23/2013    Overactive bladder 10/6/2022    Stage 3 chronic kidney disease due to type 2 diabetes mellitus 10/6/2022    Type 2 diabetes with complication 10/6/2022        HPI  Urinary Frequency   This is a new problem. The problem has been unchanged. There has been no fever. Associated symptoms include frequency. Pertinent negatives include no chills, flank pain, hematuria or urgency. Associated symptoms comments: nocturia. Treatments tried: AZO. The treatment provided no relief.      Review of Systems   Constitutional: Negative.  Negative for appetite change, chills and fever.   HENT: Negative.     Eyes: Negative.  Negative for discharge and redness.   Respiratory: Negative.  Negative for shortness of breath.    Cardiovascular: Negative.  Negative for chest pain.   Gastrointestinal: Negative.    Endocrine: Negative.    Genitourinary:  Positive for frequency. Negative for flank pain, hematuria and urgency.   Integumentary:  Negative.   Allergic/Immunologic: Negative.    Neurological: Negative.    Psychiatric/Behavioral: Negative.     All other systems reviewed and are negative.       Objective:      Vitals:    12/12/22 0859   BP: 108/69   Pulse: 69   Resp: 16   Temp: 97.4 °F (36.3 °C)      Body mass index is 32.54 kg/m².     Physical Exam  Vitals reviewed.   Constitutional:       Appearance: Normal appearance.   HENT:      Head: Normocephalic.      Mouth/Throat:      Mouth: Mucous membranes are moist.   Eyes:      Conjunctiva/sclera: Conjunctivae normal.      Pupils: Pupils are equal, round,  and reactive to light.   Cardiovascular:      Rate and Rhythm: Normal rate and regular rhythm.   Pulmonary:      Effort: Pulmonary effort is normal. No respiratory distress.      Breath sounds: Normal breath sounds.   Musculoskeletal:         General: Normal range of motion.      Cervical back: Normal range of motion and neck supple.   Skin:     General: Skin is warm and dry.   Neurological:      Mental Status: She is alert and oriented to person, place, and time.   Psychiatric:         Mood and Affect: Mood normal.          Current Outpatient Medications:     atorvastatin (LIPITOR) 40 MG tablet, TAKE 1 TABLET BY MOUTH DAILY, Disp: 90 tablet, Rfl: 3    levothyroxine (SYNTHROID) 50 MCG tablet, Take 50 mcg by mouth once daily., Disp: , Rfl:     lisinopriL (PRINIVIL,ZESTRIL) 2.5 MG tablet, Take 1 tablet (2.5 mg total) by mouth once daily., Disp: 90 tablet, Rfl: 3    memantine (NAMENDA) 5 MG Tab, TAKE 1 TABLET BY MOUTH TWICE DAILY, Disp: 60 tablet, Rfl: 11    multivitamin capsule, Take 1 capsule by mouth once daily., Disp: , Rfl:     metFORMIN (GLUCOPHAGE) 500 MG tablet, Take 500 mg by mouth once daily., Disp: , Rfl:     nystatin (MYCOSTATIN) ointment, Apply topically 2 (two) times daily. Apply to skin folds for itching and redness for 14 days, Disp: 30 g, Rfl: 3    Assessment & Plan:     Problem List Items Addressed This Visit          Renal/    Urinary frequency - Primary     Will obtain U/A and culture  If +, will treat appropriately  If negative, will consider treating hx of overactive bladder (gemtasia if aplicable)         Relevant Orders    Urine culture    Urinalysis, Reflex to Urine Culture Urine, Clean Catch          Prior to the patient's arrival on the same day, I spent (10) minutes reviewing chart. Once in the exam room with the patient, I spent (5 ) minutes in the room with the member performing a history and exam as well as reviewing the test results and recommendations with the patient. After leaving  the exam room, I spent an additional (5 ) minutes completing the electronic health record. The total time spent that day caring for the member is (20) minutes, and this time - including the breakdown - is documented in the medical record.

## 2022-12-12 NOTE — ASSESSMENT & PLAN NOTE
Will obtain U/A and culture  If +, will treat appropriately  If negative, will consider treating hx of overactive bladder (gemtasia if aplicable)

## 2022-12-13 RX ORDER — NITROFURANTOIN 25; 75 MG/1; MG/1
100 CAPSULE ORAL 2 TIMES DAILY
Qty: 10 CAPSULE | Refills: 0 | Status: SHIPPED | OUTPATIENT
Start: 2022-12-13 | End: 2022-12-18

## 2022-12-14 LAB — BACTERIA UR CULT: ABNORMAL

## 2023-01-09 PROBLEM — N39.0 RECURRENT UTI (URINARY TRACT INFECTION): Status: RESOLVED | Noted: 2022-10-06 | Resolved: 2023-01-09

## 2023-03-24 ENCOUNTER — TELEPHONE (OUTPATIENT)
Dept: FAMILY MEDICINE | Facility: CLINIC | Age: 78
End: 2023-03-24
Payer: MEDICARE

## 2023-03-24 DIAGNOSIS — E11.69 HYPERLIPIDEMIA ASSOCIATED WITH TYPE 2 DIABETES MELLITUS: Primary | ICD-10-CM

## 2023-03-24 DIAGNOSIS — E78.5 HYPERLIPIDEMIA ASSOCIATED WITH TYPE 2 DIABETES MELLITUS: Primary | ICD-10-CM

## 2023-03-24 DIAGNOSIS — F03.90 DEMENTIA, UNSPECIFIED DEMENTIA SEVERITY, UNSPECIFIED DEMENTIA TYPE, UNSPECIFIED WHETHER BEHAVIORAL, PSYCHOTIC, OR MOOD DISTURBANCE OR ANXIETY: ICD-10-CM

## 2023-03-27 RX ORDER — MEMANTINE HYDROCHLORIDE 5 MG/1
5 TABLET ORAL 2 TIMES DAILY
Qty: 60 TABLET | Refills: 11 | Status: SHIPPED | OUTPATIENT
Start: 2023-03-27 | End: 2023-10-30

## 2023-03-27 RX ORDER — ATORVASTATIN CALCIUM 40 MG/1
40 TABLET, FILM COATED ORAL DAILY
Qty: 90 TABLET | Refills: 3 | Status: SHIPPED | OUTPATIENT
Start: 2023-03-27 | End: 2023-07-13

## 2023-03-27 NOTE — TELEPHONE ENCOUNTER
Please f/u with pharmacy- we stopped wellbutrin maybe a year or longer ago because of concern of weight loss. Let's find out the date of last refill as I do not have it on my OV notes  thanks

## 2023-04-10 NOTE — TELEPHONE ENCOUNTER
Attempted to call pt's daughter a 3rd time  No answer lvm  Unable to reach  Pt was supposed to have an appt with Bernadine Ames NP but did not show

## 2023-04-13 ENCOUNTER — OFFICE VISIT (OUTPATIENT)
Dept: FAMILY MEDICINE | Facility: CLINIC | Age: 78
End: 2023-04-13
Payer: MEDICARE

## 2023-04-13 ENCOUNTER — DOCUMENTATION ONLY (OUTPATIENT)
Dept: FAMILY MEDICINE | Facility: CLINIC | Age: 78
End: 2023-04-13

## 2023-04-13 ENCOUNTER — LAB VISIT (OUTPATIENT)
Dept: LAB | Facility: HOSPITAL | Age: 78
End: 2023-04-13
Attending: INTERNAL MEDICINE
Payer: MEDICARE

## 2023-04-13 VITALS
BODY MASS INDEX: 31.6 KG/M2 | HEART RATE: 99 BPM | SYSTOLIC BLOOD PRESSURE: 110 MMHG | WEIGHT: 167.38 LBS | HEIGHT: 61 IN | OXYGEN SATURATION: 98 % | DIASTOLIC BLOOD PRESSURE: 73 MMHG

## 2023-04-13 DIAGNOSIS — E11.69 HYPERLIPIDEMIA ASSOCIATED WITH TYPE 2 DIABETES MELLITUS: ICD-10-CM

## 2023-04-13 DIAGNOSIS — E11.22 STAGE 3 CHRONIC KIDNEY DISEASE DUE TO TYPE 2 DIABETES MELLITUS: ICD-10-CM

## 2023-04-13 DIAGNOSIS — N18.30 STAGE 3 CHRONIC KIDNEY DISEASE DUE TO TYPE 2 DIABETES MELLITUS: ICD-10-CM

## 2023-04-13 DIAGNOSIS — I15.2 HYPERTENSION ASSOCIATED WITH TYPE 2 DIABETES MELLITUS: ICD-10-CM

## 2023-04-13 DIAGNOSIS — Z13.31 POSITIVE DEPRESSION SCREENING: ICD-10-CM

## 2023-04-13 DIAGNOSIS — Z00.00 WELLNESS EXAMINATION: Primary | ICD-10-CM

## 2023-04-13 DIAGNOSIS — E11.59 HYPERTENSION ASSOCIATED WITH TYPE 2 DIABETES MELLITUS: ICD-10-CM

## 2023-04-13 DIAGNOSIS — Z00.00 WELLNESS EXAMINATION: ICD-10-CM

## 2023-04-13 DIAGNOSIS — E78.5 HYPERLIPIDEMIA ASSOCIATED WITH TYPE 2 DIABETES MELLITUS: ICD-10-CM

## 2023-04-13 DIAGNOSIS — E66.09 CLASS 1 OBESITY DUE TO EXCESS CALORIES WITH SERIOUS COMORBIDITY AND BODY MASS INDEX (BMI) OF 31.0 TO 31.9 IN ADULT: ICD-10-CM

## 2023-04-13 DIAGNOSIS — F03.90 DEMENTIA, UNSPECIFIED DEMENTIA SEVERITY, UNSPECIFIED DEMENTIA TYPE, UNSPECIFIED WHETHER BEHAVIORAL, PSYCHOTIC, OR MOOD DISTURBANCE OR ANXIETY: ICD-10-CM

## 2023-04-13 DIAGNOSIS — E03.9 HYPOTHYROIDISM, UNSPECIFIED TYPE: ICD-10-CM

## 2023-04-13 DIAGNOSIS — F33.1 MODERATE EPISODE OF RECURRENT MAJOR DEPRESSIVE DISORDER: ICD-10-CM

## 2023-04-13 DIAGNOSIS — E11.8 TYPE 2 DIABETES WITH COMPLICATION: ICD-10-CM

## 2023-04-13 DIAGNOSIS — M81.0 AGE-RELATED OSTEOPOROSIS WITHOUT CURRENT PATHOLOGICAL FRACTURE: ICD-10-CM

## 2023-04-13 DIAGNOSIS — R35.0 URINARY FREQUENCY: ICD-10-CM

## 2023-04-13 PROBLEM — E66.2 CLASS 1 OBESITY WITH ALVEOLAR HYPOVENTILATION, SERIOUS COMORBIDITY, AND BODY MASS INDEX (BMI) OF 31.0 TO 31.9 IN ADULT: Status: ACTIVE | Noted: 2023-04-13

## 2023-04-13 PROCEDURE — 3288F PR FALLS RISK ASSESSMENT DOCUMENTED: ICD-10-PCS | Mod: CPTII,,, | Performed by: NURSE PRACTITIONER

## 2023-04-13 PROCEDURE — 3078F DIAST BP <80 MM HG: CPT | Mod: CPTII,,, | Performed by: NURSE PRACTITIONER

## 2023-04-13 PROCEDURE — 99213 PR OFFICE/OUTPT VISIT, EST, LEVL III, 20-29 MIN: ICD-10-PCS | Mod: 25,,, | Performed by: NURSE PRACTITIONER

## 2023-04-13 PROCEDURE — 1101F PT FALLS ASSESS-DOCD LE1/YR: CPT | Mod: CPTII,,, | Performed by: NURSE PRACTITIONER

## 2023-04-13 PROCEDURE — G0439 PR MEDICARE ANNUAL WELLNESS SUBSEQUENT VISIT: ICD-10-PCS | Mod: ,,, | Performed by: NURSE PRACTITIONER

## 2023-04-13 PROCEDURE — 1101F PR PT FALLS ASSESS DOC 0-1 FALLS W/OUT INJ PAST YR: ICD-10-PCS | Mod: CPTII,,, | Performed by: NURSE PRACTITIONER

## 2023-04-13 PROCEDURE — 1159F PR MEDICATION LIST DOCUMENTED IN MEDICAL RECORD: ICD-10-PCS | Mod: CPTII,,, | Performed by: NURSE PRACTITIONER

## 2023-04-13 PROCEDURE — 99213 OFFICE O/P EST LOW 20 MIN: CPT | Mod: 25,,, | Performed by: NURSE PRACTITIONER

## 2023-04-13 PROCEDURE — 1160F RVW MEDS BY RX/DR IN RCRD: CPT | Mod: CPTII,,, | Performed by: NURSE PRACTITIONER

## 2023-04-13 PROCEDURE — 1159F MED LIST DOCD IN RCRD: CPT | Mod: CPTII,,, | Performed by: NURSE PRACTITIONER

## 2023-04-13 PROCEDURE — 3074F SYST BP LT 130 MM HG: CPT | Mod: CPTII,,, | Performed by: NURSE PRACTITIONER

## 2023-04-13 PROCEDURE — 3078F PR MOST RECENT DIASTOLIC BLOOD PRESSURE < 80 MM HG: ICD-10-PCS | Mod: CPTII,,, | Performed by: NURSE PRACTITIONER

## 2023-04-13 PROCEDURE — 3074F PR MOST RECENT SYSTOLIC BLOOD PRESSURE < 130 MM HG: ICD-10-PCS | Mod: CPTII,,, | Performed by: NURSE PRACTITIONER

## 2023-04-13 PROCEDURE — 3288F FALL RISK ASSESSMENT DOCD: CPT | Mod: CPTII,,, | Performed by: NURSE PRACTITIONER

## 2023-04-13 PROCEDURE — 1160F PR REVIEW ALL MEDS BY PRESCRIBER/CLIN PHARMACIST DOCUMENTED: ICD-10-PCS | Mod: CPTII,,, | Performed by: NURSE PRACTITIONER

## 2023-04-13 PROCEDURE — G0439 PPPS, SUBSEQ VISIT: HCPCS | Mod: ,,, | Performed by: NURSE PRACTITIONER

## 2023-04-13 RX ORDER — BUPROPION HYDROCHLORIDE 150 MG/1
150 TABLET ORAL DAILY
Qty: 30 TABLET | Refills: 1 | Status: SHIPPED | OUTPATIENT
Start: 2023-04-13 | End: 2023-05-08

## 2023-04-13 NOTE — PROGRESS NOTES
Patient ID: [id number]     Chief Complaint: Medicare AWV (Alzheimers worsening stated by daughter and has been asking to go to bathroom every 5 mins even though nothing has happened. Daughter says she doesn't believe it is uti since she was taking some medications for that. Would like to discuss putting her back on wellbutrin) and Medication Refill (Also needs refills on all meds daughter says and was wondering if she could get vitamin prescriptions as well since with insurance they get free vitamins)      HPI:     Miladys Smith is a 78 y.o. female here today for a Medicare Wellness.       Opioid Screening: Patient medication list reviewed, patient {IS / IS NOT:78492} taking prescription opioids. Patient {IS / IS NOT:12424} using additional opioids than prescribed. Patient {IS / IS NOT:59908} at low risk of substance abuse based on this opioid use history.       ----------------------------  Constipation due to pain medication      Comment:  Formatting of this note might be different from the                original. After surgery  Dementia  Hyperlipidemia associated with type 2 diabetes mellitus  Osteoporosis  Overactive bladder  Stage 3 chronic kidney disease due to type 2 diabetes mellitus  Type 2 diabetes with complication     Past Surgical History:   Procedure Laterality Date    TOTAL KNEE ARTHROPLASTY Right        Review of patient's allergies indicates:  No Known Allergies    Outpatient Medications Marked as Taking for the 4/13/23 encounter (Office Visit) with Bernadine Ames NP   Medication Sig Dispense Refill    atorvastatin (LIPITOR) 40 MG tablet Take 1 tablet (40 mg total) by mouth once daily. 90 tablet 3    levothyroxine (SYNTHROID) 50 MCG tablet Take 50 mcg by mouth once daily.      lisinopriL (PRINIVIL,ZESTRIL) 2.5 MG tablet Take 1 tablet (2.5 mg total) by mouth once daily. 90 tablet 3    memantine (NAMENDA) 5 MG Tab Take 1 tablet (5 mg total) by mouth 2 (two) times daily. 60 tablet 11     metFORMIN (GLUCOPHAGE) 500 MG tablet Take 500 mg by mouth once daily.      multivitamin capsule Take 1 capsule by mouth once daily.         Social History     Socioeconomic History    Marital status: Significant Other   Tobacco Use    Smoking status: Never    Smokeless tobacco: Never   Substance and Sexual Activity    Alcohol use: Not Currently    Drug use: Never        History reviewed. No pertinent family history.     Patient Care Team:  Jose Miguel Clarke MD as PCP - General (Internal Medicine)  Jose Miguel Clarke MD       Subjective:     Review of Systems   Constitutional: Negative.    HENT: Negative.     Eyes: Negative.    Respiratory: Negative.     Cardiovascular: Negative.    Gastrointestinal: Negative.    Genitourinary:  Positive for frequency.   Musculoskeletal: Negative.    Skin: Negative.    Neurological:         Memory loss   Endo/Heme/Allergies: Negative.    Psychiatric/Behavioral:  Positive for depression. Negative for suicidal ideas. The patient is not nervous/anxious and does not have insomnia.    All other systems reviewed and are negative.      Patient Reported Health Risk Assessment  What is your age?: 80 or older  Are you male or female?: Female  During the past four weeks, how much have you been bothered by emotional problems such as feeling anxious, depressed, irritable, sad, or downhearted and blue?: Quite a bit  During the past five weeks, has your physical and/or emotional health limited your social activities with family, friends, neighbors, or groups?: Quite a bit  During the past four weeks, how much bodily pain have you generally had?: Very mild pain  During the past four weeks, was someone available to help if you needed and wanted help?: Yes, as much as I wanted  During the past four weeks, what was the hardest physical activity you could do for at least two minutes?: Very light  Can you get to places out of walking distance without help?  (For example, can you travel alone on buses or  taxis, or drive your own car?): No  Can you go shopping for groceries or clothes without someone's help?: No  Can you prepare your own meals?: No  Can you do your own housework without help?: No  Because of any health problems, do you need the help of another person with your personal care needs such as eating, bathing, dressing, or getting around the house?: Yes  Can you handle your own money without help?: No  During the past four weeks, how would you rate your health in general?: Fair  How have things been going for you during the past four weeks?: Good and bad parts about equal  Are you having difficulties driving your car?: Not applicable, I do not use a car  Do you always fasten your seat belt when you are in a car?: Yes, usually  How often in the past four weeks have you been bothered by falling or dizzy when standing up?: Seldom  How often in the past four weeks have you been bothered by sexual problems?: Never  How often in the past four weeks have you been bothered by trouble eating well?: Never  How often in the past four weeks have you been bothered by teeth or denture problems?: Never  How often in the past four weeks have you been bothered with problems using the telephone?: Always  How often in the past four weeks have you been bothered by tiredness or fatigue?: Often  Have you fallen two or more times in the past year?: No  Are you afraid of falling?: Yes  Are you a smoker?: No  During the past four weeks, how many drinks of wine, beer, or other alcoholic beverages did you have?: No alcohol at all  Do you exercise for about 20 minutes three or more days a week?: No, I usually do not exercise this much  Have you been given any information to help you with hazards in your house that might hurt you?: Yes  Have you been given any information to help you with keeping track of your medications?: Yes  How often do you have trouble taking medicines the way you've been told to take them?: I always take them  "as prescribed  How confident are you that you can control and manage most of your health problems?: Somewhat confident  What is your race? (Check all that apply.):     Objective:     /73   Pulse 99   Ht 5' 1" (1.549 m)   Wt 75.9 kg (167 lb 6.4 oz)   SpO2 98%   BMI 31.63 kg/m²     Physical Exam  Vitals reviewed.   Constitutional:       Appearance: Normal appearance.   HENT:      Head: Normocephalic.      Mouth/Throat:      Mouth: Mucous membranes are moist.   Eyes:      Conjunctiva/sclera: Conjunctivae normal.      Pupils: Pupils are equal, round, and reactive to light.   Cardiovascular:      Rate and Rhythm: Normal rate and regular rhythm.   Pulmonary:      Effort: Pulmonary effort is normal. No respiratory distress.      Breath sounds: Normal breath sounds.   Musculoskeletal:         General: Normal range of motion.      Cervical back: Normal range of motion and neck supple.   Skin:     General: Skin is warm and dry.   Neurological:      Mental Status: She is alert and oriented to person, place, and time.   Psychiatric:         Mood and Affect: Mood normal.         No flowsheet data found.  Fall Risk Assessment - Outpatient 4/13/2023 10/24/2022 10/6/2022   Mobility Status Ambulatory w/ assistance Ambulatory w/ assistance Ambulatory   Number of falls 0 - 0   Identified as fall risk 1 - 0           Depression Screening  Over the past two weeks, has the patient felt down, depressed, or hopeless?: Yes  Over the past two weeks, has the patient felt little interest or pleasure in doing things?: Yes  Functional Ability/Safety Screening  Was the patient's timed Up & Go test unsteady or longer than 30 seconds?: Yes  Does the patient need help with phone, transportation, shopping, preparing meals, housework, laundry, meds, or managing money?: Yes  Does the patient's home have rugs in the hallway, lack grab bars in the bathroom, lack handrails on the stairs or have poor lighting?: No  Have you noticed any " hearing difficulties?: Yes  Cognitive Function (Assessed through direct observation with due consideration of information obtained by way of patient reports and/or concerns raised by family, friends, caretakers, or others)    Does the patient repeat questions/statements in the same day?: Yes  Does the patient have trouble remembering the date, year, and time?: Yes  Does the patient have difficulty managing finances?: Yes  Does the patient have a decreased sense of direction?: Yes  Assessment/Plan:     1. Wellness examination  Assessment & Plan:  Labs due and ordered  DEXA up to date (10/2021); Osteoporosis;On Prolia injections  DM eye exam  DM foot exam due; done in office on today  Microalbumin due  MMG?        2. Hypertension associated with type 2 diabetes mellitus    3. Stage 3 chronic kidney disease due to type 2 diabetes mellitus    4. Hyperlipidemia associated with type 2 diabetes mellitus    5. Bilateral carotid artery stenosis    6. Dementia, unspecified dementia severity, unspecified dementia type, unspecified whether behavioral, psychotic, or mood disturbance or anxiety  Assessment & Plan:  Daughter states worsening symptoms  On Namenda  Will like Neurology referral for eval/treatment    Orders:  -     Ambulatory referral/consult to Neurology; Future; Expected date: 04/20/2023    7. Type 2 diabetes with complication    8. Hypothyroidism, unspecified type    9. Positive depression screening  Comments:  I have reviewed the positive depression score which warrants active treatment with psychotherapy and/or medications.    10. Moderate episode of recurrent major depressive disorder  Assessment & Plan:  Worsening symptoms  Previously taking Wellbutrin but weaned off  Did well with Wellbutrin; will like to resume  Rx Wellbutrin  mg po daily  F/U with PCP 6 weeks  Instructed to continue to monitor symptoms  Report to ED for any CP, SOB, suicidal/homicidal ideation, and/or worsening symptoms  Pt is  agreeable to plan and verbalizes understanding          Other orders  -     buPROPion (WELLBUTRIN XL) 150 MG TB24 tablet; Take 1 tablet (150 mg total) by mouth once daily.  Dispense: 30 tablet; Refill: 1           Medicare Annual Wellness and Personalized Prevention Plan:   Fall Risk + Home Safety + Hearing Impairment + Depression Screen + Opioid and Substance Abuse Screening + Cognitive Impairment Screen + Health Risk Assessment all reviewed.     Health Maintenance Topics with due status: Not Due       Topic Last Completion Date    DEXA Scan 10/25/2021    Diabetes Urine Screening 10/04/2022    Lipid Panel 10/04/2022      The patient's Health Maintenance was reviewed and the following appears to be due at this time:   Health Maintenance Due   Topic Date Due    TETANUS VACCINE  Never done    Shingles Vaccine (2 of 3) 10/15/2013    COVID-19 Vaccine (3 - Booster for [place] series) 02/28/2022    Eye Exam  07/01/2022    Hemoglobin A1c  04/04/2023       Advance Care Planning   I attest to discussing Advance Care Planning with patient and/or family member.  Education was provided including the importance of the Health Care Power of , Advance Directives, and/or [place] documentation.  The patient expressed understanding to the importance of this information and discussion.         Follow up in about 6 weeks (around 5/25/2023) for 6- week F/U with PCP; Depression. In addition to their scheduled follow up, the patient has also been instructed to follow up on as needed basis.         I have reviewed the positive depression score which warrants active treatment with psychotherapy and/or medications. ***

## 2023-04-13 NOTE — PROGRESS NOTES
Patient ID: [id number]     Chief Complaint: Medicare AWV (Alzheimers worsening stated by daughter and has been asking to go to bathroom every 5 mins even though nothing has happened. Daughter says she doesn't believe it is uti since she was taking some medications for that. Would like to discuss putting her back on wellbutrin) and Medication Refill (Also needs refills on all meds daughter says and was wondering if she could get vitamin prescriptions as well since with insurance they get free vitamins)      HPI:     Miladys Smith is a 78 y.o. female here today for a Medicare Wellness.     Labs due and ordered  DEXA up to date (10/2021); Osteoporosis;On Prolia injections  DM eye exam 10/2022; results requested from [facility]  DM foot exam due; done in office on today  Microalbumin due; ordered today  MMG: Declines    A significant/separate E/M service was provided in order to evaluate Depression.  Hx of Depression for many years.  Daughter states mother seems to have depressed mood.  Symptoms are associated with fatigue, poor appetite, little energy, and trouble concentration.  Denies any suicidal/homicidal ideations. Pt has taken Wellbutrin in the past and did well.  Daughter/Pt would like to resume Wellbutrin.  Denies any other problems.      A significant/separate E/M service was provided in order to evaluate Dementia.  Daughter states worsening Alzheimer's worsening Pt has been asking to go to bathroom every 5 minutes.  STM/LTM loss worsening.  Daughter says she doesn't believe it is UTI; states she sits on toilet for 20 minutes and does not use the restroom. Would like to discuss putting her back on wellbutrin.    Opioid Screening: Patient medication list reviewed, patient is not taking prescription opioids. Patient is not using additional opioids than prescribed. Patient is at low risk of substance abuse based on this opioid use history.       ----------------------------  Blindness of left eye  Constipation due  to pain medication      Comment:  Formatting of this note might be different from the                original. After surgery  Dementia  [place] (hard of hearing)  Hyperlipidemia associated with type 2 diabetes mellitus  Osteoporosis  Overactive bladder  Stage 3 chronic kidney disease due to type 2 diabetes mellitus  Type 2 diabetes with complication     Past Surgical History:   Procedure Laterality Date    TOTAL KNEE ARTHROPLASTY Right        Review of patient's allergies indicates:  No Known Allergies    Outpatient Medications Marked as Taking for the 4/13/23 encounter (Office Visit) with Bernadine Ames NP   Medication Sig Dispense Refill    atorvastatin (LIPITOR) 40 MG tablet Take 1 tablet (40 mg total) by mouth once daily. 90 tablet 3    levothyroxine (SYNTHROID) 50 MCG tablet Take 50 mcg by mouth once daily.      lisinopriL (PRINIVIL,ZESTRIL) 2.5 MG tablet Take 1 tablet (2.5 mg total) by mouth once daily. 90 tablet 3    memantine (NAMENDA) 5 MG Tab Take 1 tablet (5 mg total) by mouth 2 (two) times daily. 60 tablet 11    metFORMIN (GLUCOPHAGE) 500 MG tablet Take 500 mg by mouth once daily.      multivitamin capsule Take 1 capsule by mouth once daily.         Social History     Socioeconomic History    Marital status: Significant Other   Tobacco Use    Smoking status: Never    Smokeless tobacco: Never   Substance and Sexual Activity    Alcohol use: Not Currently    Drug use: Never        History reviewed. No pertinent family history.     Patient Care Team:  Jose Miguel Clarke MD as PCP - General (Internal Medicine)  Jose Miguel Clarke MD       Subjective:     Review of Systems   Constitutional:         Fatigue   HENT: Negative.     Eyes: Negative.    Respiratory: Negative.     Cardiovascular: Negative.    Gastrointestinal:  Negative for abdominal pain, diarrhea, nausea and vomiting.   Genitourinary:  Positive for frequency and urgency. Negative for dysuria, flank pain and hematuria.   Musculoskeletal: Negative.    Skin:  Negative.    Neurological: Negative.    Psychiatric/Behavioral:  Positive for depression and memory loss. Negative for hallucinations and suicidal ideas. The patient has insomnia.    All other systems reviewed and are negative.      Patient Reported Health Risk Assessment  What is your age?: 78 or older  Are you male or female?: Female  During the past four weeks, how much have you been bothered by emotional problems such as feeling anxious, depressed, irritable, sad, or downhearted and blue?: Quite a bit  During the past five weeks, has your physical and/or emotional health limited your social activities with family, friends, neighbors, or groups?: Quite a bit  During the past four weeks, how much bodily pain have you generally had?: Very mild pain  During the past four weeks, was someone available to help if you needed and wanted help?: Yes, as much as I wanted  During the past four weeks, what was the hardest physical activity you could do for at least two minutes?: Very light  Can you get to places out of walking distance without help?  (For example, can you travel alone on buses or taxis, or drive your own car?): No  Can you go shopping for groceries or clothes without someone's help?: No  Can you prepare your own meals?: No  Can you do your own housework without help?: No  Because of any health problems, do you need the help of another person with your personal care needs such as eating, bathing, dressing, or getting around the house?: Yes  Can you handle your own money without help?: No  During the past four weeks, how would you rate your health in general?: Fair  How have things been going for you during the past four weeks?: Good and bad parts about equal  Are you having difficulties driving your car?: Not applicable, I do not use a car  Do you always fasten your seat belt when you are in a car?: Yes, usually  How often in the past four weeks have you been bothered by falling or dizzy when standing up?:  "Seldom  How often in the past four weeks have you been bothered by sexual problems?: Never  How often in the past four weeks have you been bothered by trouble eating well?: Never  How often in the past four weeks have you been bothered by teeth or denture problems?: Never  How often in the past four weeks have you been bothered with problems using the telephone?: Always  How often in the past four weeks have you been bothered by tiredness or fatigue?: Often  Have you fallen two or more times in the past year?: No  Are you afraid of falling?: Yes  Are you a smoker?: No  During the past four weeks, how many drinks of wine, beer, or other alcoholic beverages did you have?: No alcohol at all  Do you exercise for about 20 minutes three or more days a week?: No, I usually do not exercise this much  Have you been given any information to help you with hazards in your house that might hurt you?: Yes  Have you been given any information to help you with keeping track of your medications?: Yes  How often do you have trouble taking medicines the way you've been told to take them?: I always take them as prescribed  How confident are you that you can control and manage most of your health problems?: Somewhat confident  What is your race? (Check all that apply.):     Objective:     /73   Pulse 99   Ht 5' 1" (1.549 m)   Wt 75.9 kg (167 lb 6.4 oz)   SpO2 98%   BMI 31.63 kg/m²     Physical Exam  Vitals reviewed.   Constitutional:       Appearance: Normal appearance.   HENT:      Head: Normocephalic.      Mouth/Throat:      Mouth: Mucous membranes are moist.   Eyes:      Conjunctiva/sclera: Conjunctivae normal.      Pupils: Pupils are equal, round, and reactive to light.   Cardiovascular:      Rate and Rhythm: Normal rate and regular rhythm.   Pulmonary:      Effort: Pulmonary effort is normal. No respiratory distress.      Breath sounds: Normal breath sounds.   Musculoskeletal:         General: Normal range of " motion.      Cervical back: Normal range of motion and neck supple.   Skin:     General: Skin is warm and dry.   Neurological:      Mental Status: She is alert and oriented to person, place, and time.   Psychiatric:         Mood and Affect: Mood normal.     Diabetic foot exam:   Left: Sharp/dull discrimination normal   Filament test present  Right: Sharp/dull discrimination normal   Filament test present  Diabetic foot exam:   Left: Sharp/dull discrimination normal   Filament test present  Right: Sharp/dull discrimination normal   Filament test present  General Appearance bilateral feet: dry, warm, skin intact, appropriate hair growth,   Toe Nails: + overgrown toenails  Posterior Tibial Pulse: 1+ bilaterally  Dorsalis Pedis Pulse: 1+ bilaterally  Capillary Refill: < 3 sec bilaterally  Presence of Ulcer: No ulcers bilaterally  Monofilament Testing: Normal, no evidence of peripheral neuropathy bilaterally       No flowsheet data found.  Fall Risk Assessment - Outpatient 4/13/2023 10/24/2022 10/6/2022   Mobility Status Ambulatory w/ assistance Ambulatory w/ assistance Ambulatory   Number of falls 0 - 0   Identified as fall risk 1 - 0           Depression Screening  Over the past two weeks, has the patient felt down, depressed, or hopeless?: Yes  Over the past two weeks, has the patient felt little interest or pleasure in doing things?: Yes  Functional Ability/Safety Screening  Was the patient's timed Up & Go test unsteady or longer than 30 seconds?: Yes  Does the patient need help with phone, transportation, shopping, preparing meals, housework, laundry, meds, or managing money?: Yes  Does the patient's home have rugs in the hallway, lack grab bars in the bathroom, lack handrails on the stairs or have poor lighting?: No  Have you noticed any hearing difficulties?: Yes  Cognitive Function (Assessed through direct observation with due consideration of information obtained by way of patient reports and/or concerns raised  by family, friends, caretakers, or others)    Does the patient repeat questions/statements in the same day?: Yes  Does the patient have trouble remembering the date, year, and time?: Yes  Does the patient have difficulty managing finances?: Yes  Does the patient have a decreased sense of direction?: Yes  Assessment/Plan:     1. Wellness examination  Assessment & Plan:  Labs due and ordered  DEXA up to date (10/2021); Osteoporosis;On Prolia injections  DM eye exam 10/2022; results requested from [facility]  DM foot exam due; done in office on today  Microalbumin due; ordered today  MMG: declines        Orders:  -     CBC Auto Differential; Future; Expected date: 04/13/2023  -     Comprehensive Metabolic Panel; Future; Expected date: 04/13/2023  -     TSH; Future; Expected date: 04/13/2023  -     Hemoglobin A1C; Future; Expected date: 04/13/2023  -     Urinalysis, Reflex to Urine Culture    2. Hypertension associated with type 2 diabetes mellitus  Assessment & Plan:  BP at goal  Continue current medication  Daily BP check; bring log all clinic visits  Instructed to report to ED for any BP greater than 200/100, dizziness, syncope, CP, or SOB  Keep appt. With PCP for follow up  Patient is agreeable to plan and verbalizes understanding          3. Stage 3 chronic kidney disease due to type 2 diabetes mellitus  Assessment & Plan:  Stable  Avoid NSAIDS  Keep appt with PCP for follow up      4. Hyperlipidemia associated with type 2 diabetes mellitus  Assessment & Plan:  Stable  Continue Statin as prescribed  Keep appt with PCP for follow up    Orders:  -     CBC Auto Differential; Future; Expected date: 04/13/2023  -     Comprehensive Metabolic Panel; Future; Expected date: 04/13/2023  -     TSH; Future; Expected date: 04/13/2023  -     Hemoglobin A1C; Future; Expected date: 04/13/2023  -     Urinalysis, Reflex to Urine Culture    5. Dementia, unspecified dementia severity, unspecified dementia type, unspecified whether  behavioral, psychotic, or mood disturbance or anxiety  Assessment & Plan:  Daughter states worsening symptoms  On Namenda  Will like Neurology referral for eval/treatment    Orders:  -     Ambulatory referral/consult to Neurology; Future; Expected date: 04/20/2023    6. Type 2 diabetes with complication  Assessment & Plan:  Most recent A1C 5.9  DM eye exam 10/2022; results requested from [facility]  DM foot exam due; done in office on today; No DN; + overgrown toenails; daughter states that will take for pedicure/toenail clipping  Educated on proper foot care  Microalbumin due; ordered today  Continue meds as prescribed  Keep appt with PCP as scheduled        7. Hypothyroidism, unspecified type  Assessment & Plan:  Euthyroid  Continue meds as prescribed        8. Positive depression screening  Comments:  I have reviewed the positive depression score which warrants active treatment with psychotherapy and/or medications.    9. Moderate episode of recurrent major depressive disorder  Assessment & Plan:  Worsening symptoms  Previously taking Wellbutrin but weaned off  Did well with Wellbutrin; will like to resume  Rx Wellbutrin  mg po daily  F/U in  6 weeks for reevaluation  Instructed to continue to monitor symptoms  Report to ED for any CP, SOB, suicidal/homicidal ideation, and/or worsening symptoms  Pt is agreeable to plan and verbalizes understanding          10. Urinary frequency  Assessment & Plan:  U/A today  Will call with results    Orders:  -     Urinalysis, Reflex to Urine Culture    11. Class 1 obesity due to excess calories with serious comorbidity and body mass index (BMI) of 31.0 to 31.9 in adult  Assessment & Plan:  Try ambulating around the house 3-4 times per day with assistance  DM diet      Other orders  -     buPROPion (WELLBUTRIN XL) 150 MG TB24 tablet; Take 1 tablet (150 mg total) by mouth once daily.  Dispense: 30 tablet; Refill: 1           Medicare Annual Wellness and Personalized Prevention  Plan:   Fall Risk + Home Safety + Hearing Impairment + Depression Screen + Opioid and Substance Abuse Screening + Cognitive Impairment Screen + Health Risk Assessment all reviewed.     Health Maintenance Topics with due status: Not Due       Topic Last Completion Date    DEXA Scan 10/25/2021    Diabetes Urine Screening 10/04/2022    Lipid Panel 10/04/2022    Hemoglobin A1c 04/13/2023      The patient's Health Maintenance was reviewed and the following appears to be due at this time:   Health Maintenance Due   Topic Date Due    TETANUS VACCINE  Never done    Shingles Vaccine (2 of 3) 10/15/2013    COVID-19 Vaccine (3 - Booster for [place] series) 02/28/2022    Eye Exam  07/01/2022       Advance Care Planning   I attest to discussing Advance Care Planning with patient and/or family member.  Education was provided including the importance of the Health Care Power of , Advance Directives, and/or [place] documentation.  The patient expressed understanding to the importance of this information and discussion.         Follow up in about 6 weeks (around 5/25/2023) for 6- week F/U with PCP; Depression. In addition to their scheduled follow up, the patient has also been instructed to follow up on as needed basis.

## 2023-04-13 NOTE — ASSESSMENT & PLAN NOTE
BP at goal  Continue current medication  Daily BP check; bring log all clinic visits  Instructed to report to ED for any BP greater than 200/100, dizziness, syncope, CP, or SOB  Keep appt. With PCP for follow up  Patient is agreeable to plan and verbalizes understanding       (3) walks occasionally

## 2023-04-13 NOTE — ASSESSMENT & PLAN NOTE
Labs due and ordered  DEXA up to date (10/2021); Osteoporosis;On Prolia injections  DM eye exam 10/2022; results requested from [facility]  DM foot exam due; done in office on today  Microalbumin due; ordered today  MMG: declines

## 2023-04-13 NOTE — ASSESSMENT & PLAN NOTE
Worsening symptoms  Previously taking Wellbutrin but weaned off  Did well with Wellbutrin; will like to resume  Rx Wellbutrin  mg po daily  F/U in  6 weeks for reevaluation  Instructed to continue to monitor symptoms  Report to ED for any CP, SOB, suicidal/homicidal ideation, and/or worsening symptoms  Pt is agreeable to plan and verbalizes understanding

## 2023-04-13 NOTE — ASSESSMENT & PLAN NOTE
Most recent A1C 5.9  DM eye exam 10/2022; results requested from [facility]  DM foot exam due; done in office on today; No DN; + overgrown toenails; daughter states that will take for pedicure/toenail clipping  Educated on proper foot care  Microalbumin due; ordered today  Continue meds as prescribed  Keep appt with PCP as scheduled

## 2023-04-17 ENCOUNTER — TELEPHONE (OUTPATIENT)
Dept: FAMILY MEDICINE | Facility: CLINIC | Age: 78
End: 2023-04-17
Payer: MEDICARE

## 2023-04-17 DIAGNOSIS — N95.9 UNSPECIFIED MENOPAUSAL AND PERIMENOPAUSAL DISORDER: Primary | ICD-10-CM

## 2023-04-18 NOTE — TELEPHONE ENCOUNTER
I have re-ordered Prolia but I need to repeat the bone density test- I ordered this as well; pending results we may need to change her plan with prolia  thanks

## 2023-04-24 ENCOUNTER — INFUSION (OUTPATIENT)
Dept: INFUSION THERAPY | Facility: HOSPITAL | Age: 78
End: 2023-04-24
Attending: INTERNAL MEDICINE
Payer: MEDICARE

## 2023-04-24 VITALS
RESPIRATION RATE: 18 BRPM | SYSTOLIC BLOOD PRESSURE: 121 MMHG | OXYGEN SATURATION: 93 % | TEMPERATURE: 99 F | DIASTOLIC BLOOD PRESSURE: 81 MMHG | HEART RATE: 89 BPM

## 2023-04-24 DIAGNOSIS — M81.0 OSTEOPOROSIS, UNSPECIFIED OSTEOPOROSIS TYPE, UNSPECIFIED PATHOLOGICAL FRACTURE PRESENCE: Primary | ICD-10-CM

## 2023-04-24 PROCEDURE — 96372 THER/PROPH/DIAG INJ SC/IM: CPT

## 2023-04-24 PROCEDURE — 63600175 PHARM REV CODE 636 W HCPCS: Mod: JZ,JG | Performed by: INTERNAL MEDICINE

## 2023-04-24 RX ADMIN — DENOSUMAB 60 MG: 60 INJECTION SUBCUTANEOUS at 11:04

## 2023-05-01 ENCOUNTER — TELEPHONE (OUTPATIENT)
Dept: FAMILY MEDICINE | Facility: CLINIC | Age: 78
End: 2023-05-01
Payer: MEDICARE

## 2023-05-08 RX ORDER — BUPROPION HYDROCHLORIDE 150 MG/1
TABLET ORAL
Qty: 30 TABLET | Refills: 1 | Status: SHIPPED | OUTPATIENT
Start: 2023-05-08 | End: 2023-05-25 | Stop reason: SDUPTHER

## 2023-05-16 ENCOUNTER — TELEPHONE (OUTPATIENT)
Dept: FAMILY MEDICINE | Facility: CLINIC | Age: 78
End: 2023-05-16
Payer: MEDICARE

## 2023-05-25 ENCOUNTER — OFFICE VISIT (OUTPATIENT)
Dept: FAMILY MEDICINE | Facility: CLINIC | Age: 78
End: 2023-05-25
Payer: MEDICARE

## 2023-05-25 ENCOUNTER — TELEPHONE (OUTPATIENT)
Dept: FAMILY MEDICINE | Facility: CLINIC | Age: 78
End: 2023-05-25

## 2023-05-25 VITALS
HEIGHT: 61 IN | BODY MASS INDEX: 31.53 KG/M2 | WEIGHT: 167 LBS | HEART RATE: 89 BPM | TEMPERATURE: 98 F | RESPIRATION RATE: 18 BRPM | DIASTOLIC BLOOD PRESSURE: 75 MMHG | SYSTOLIC BLOOD PRESSURE: 113 MMHG | OXYGEN SATURATION: 97 %

## 2023-05-25 DIAGNOSIS — F33.1 MODERATE EPISODE OF RECURRENT MAJOR DEPRESSIVE DISORDER: Primary | ICD-10-CM

## 2023-05-25 DIAGNOSIS — R35.0 URINARY FREQUENCY: Primary | ICD-10-CM

## 2023-05-25 LAB
APPEARANCE UR: ABNORMAL
BACTERIA #/AREA URNS AUTO: ABNORMAL /HPF
BILIRUB UR QL STRIP.AUTO: NEGATIVE MG/DL
COLOR UR: ABNORMAL
CREAT UR-MCNC: 180.6 MG/DL (ref 47–110)
GLUCOSE UR QL STRIP.AUTO: NEGATIVE MG/DL
KETONES UR QL STRIP.AUTO: NEGATIVE MG/DL
LEUKOCYTE ESTERASE UR QL STRIP.AUTO: ABNORMAL UNIT/L
MICROALBUMIN UR-MCNC: 17.7 UG/ML
MICROALBUMIN/CREAT RATIO PNL UR: 9.8 MG/GM CR (ref 0–30)
MUCOUS THREADS URNS QL MICRO: ABNORMAL /LPF
NITRITE UR QL STRIP.AUTO: NEGATIVE
PH UR STRIP.AUTO: 6 [PH]
PROT UR QL STRIP.AUTO: NEGATIVE MG/DL
RBC #/AREA URNS AUTO: ABNORMAL /HPF
RBC UR QL AUTO: NEGATIVE UNIT/L
SP GR UR STRIP.AUTO: 1.02
SQUAMOUS #/AREA URNS AUTO: ABNORMAL /HPF
UROBILINOGEN UR STRIP-ACNC: 4 MG/DL
WBC #/AREA URNS AUTO: ABNORMAL /HPF

## 2023-05-25 PROCEDURE — 3074F SYST BP LT 130 MM HG: CPT | Mod: CPTII,,, | Performed by: NURSE PRACTITIONER

## 2023-05-25 PROCEDURE — 3074F PR MOST RECENT SYSTOLIC BLOOD PRESSURE < 130 MM HG: ICD-10-PCS | Mod: CPTII,,, | Performed by: NURSE PRACTITIONER

## 2023-05-25 PROCEDURE — 3078F DIAST BP <80 MM HG: CPT | Mod: CPTII,,, | Performed by: NURSE PRACTITIONER

## 2023-05-25 PROCEDURE — 1160F PR REVIEW ALL MEDS BY PRESCRIBER/CLIN PHARMACIST DOCUMENTED: ICD-10-PCS | Mod: CPTII,,, | Performed by: NURSE PRACTITIONER

## 2023-05-25 PROCEDURE — 99214 PR OFFICE/OUTPT VISIT, EST, LEVL IV, 30-39 MIN: ICD-10-PCS | Mod: ,,, | Performed by: NURSE PRACTITIONER

## 2023-05-25 PROCEDURE — 99214 OFFICE O/P EST MOD 30 MIN: CPT | Mod: ,,, | Performed by: NURSE PRACTITIONER

## 2023-05-25 PROCEDURE — 1101F PT FALLS ASSESS-DOCD LE1/YR: CPT | Mod: CPTII,,, | Performed by: NURSE PRACTITIONER

## 2023-05-25 PROCEDURE — 1160F RVW MEDS BY RX/DR IN RCRD: CPT | Mod: CPTII,,, | Performed by: NURSE PRACTITIONER

## 2023-05-25 PROCEDURE — 81001 URINALYSIS AUTO W/SCOPE: CPT | Performed by: NURSE PRACTITIONER

## 2023-05-25 PROCEDURE — 1101F PR PT FALLS ASSESS DOC 0-1 FALLS W/OUT INJ PAST YR: ICD-10-PCS | Mod: CPTII,,, | Performed by: NURSE PRACTITIONER

## 2023-05-25 PROCEDURE — 1159F PR MEDICATION LIST DOCUMENTED IN MEDICAL RECORD: ICD-10-PCS | Mod: CPTII,,, | Performed by: NURSE PRACTITIONER

## 2023-05-25 PROCEDURE — 3078F PR MOST RECENT DIASTOLIC BLOOD PRESSURE < 80 MM HG: ICD-10-PCS | Mod: CPTII,,, | Performed by: NURSE PRACTITIONER

## 2023-05-25 PROCEDURE — 1126F PR PAIN SEVERITY QUANTIFIED, NO PAIN PRESENT: ICD-10-PCS | Mod: CPTII,,, | Performed by: NURSE PRACTITIONER

## 2023-05-25 PROCEDURE — 3288F PR FALLS RISK ASSESSMENT DOCUMENTED: ICD-10-PCS | Mod: CPTII,,, | Performed by: NURSE PRACTITIONER

## 2023-05-25 PROCEDURE — 1126F AMNT PAIN NOTED NONE PRSNT: CPT | Mod: CPTII,,, | Performed by: NURSE PRACTITIONER

## 2023-05-25 PROCEDURE — 1159F MED LIST DOCD IN RCRD: CPT | Mod: CPTII,,, | Performed by: NURSE PRACTITIONER

## 2023-05-25 PROCEDURE — 3288F FALL RISK ASSESSMENT DOCD: CPT | Mod: CPTII,,, | Performed by: NURSE PRACTITIONER

## 2023-05-25 RX ORDER — BUPROPION HYDROCHLORIDE 300 MG/1
300 TABLET ORAL DAILY
Qty: 30 TABLET | Refills: 2 | Status: SHIPPED | OUTPATIENT
Start: 2023-05-25 | End: 2023-10-30 | Stop reason: SDUPTHER

## 2023-05-25 NOTE — TELEPHONE ENCOUNTER
Please ask lab to send urine sample to culture as she did have some WBC in it  Thanks  Please put in urine culture order

## 2023-05-25 NOTE — PROGRESS NOTES
Subjective:      Patient ID: Miladys Smith is a 78 y.o. White female      Chief Complaint: F/U Depression (Daughter states Wellbutrin is not working as well) and Medication Refill       Past Medical History:   Diagnosis Date    Blindness of left eye 2003    Constipation due to pain medication 08/23/2013    Formatting of this note might be different from the original. After surgery    Dementia 10/06/2022    [place] (hard of hearing)     Hyperlipidemia associated with type 2 diabetes mellitus 08/23/2013    Osteoporosis     Overactive bladder 10/06/2022    Stage 3 chronic kidney disease due to type 2 diabetes mellitus 10/06/2022    Type 2 diabetes with complication 10/06/2022        HPI  Presents to clinic for follow up Anxiety and Dementia    Previously evaluated for Depression.  Hx of Depression for many years.  Daughter states mother seems to have depressed mood.  Symptoms are associated with fatigue, poor appetite, little energy, and trouble concentrating.  Denies any suicidal/homicidal ideations. Pt resumed Wellbutrin at previous s/t to family's request.  States no improvement and would like to increase dosage.  Denies any other problems.           Review of Systems   Constitutional: Negative.  Negative for appetite change, chills and fever.   HENT: Negative.     Eyes: Negative.  Negative for discharge and redness.   Respiratory: Negative.  Negative for shortness of breath.    Cardiovascular: Negative.  Negative for chest pain.   Gastrointestinal: Negative.    Endocrine: Negative.    Genitourinary: Negative.    Integumentary:  Negative.   Allergic/Immunologic: Negative.    Neurological: Negative.    Psychiatric/Behavioral: Negative.     All other systems reviewed and are negative.       Objective:      Vitals:    05/25/23 0922   BP: 113/75   Pulse: 89   Resp: 18   Temp: 98 °F (36.7 °C)      Body mass index is 31.55 kg/m².     Physical Exam  Vitals reviewed.   Constitutional:       Appearance: Normal appearance.    HENT:      Head: Normocephalic.      Mouth/Throat:      Mouth: Mucous membranes are moist.   Eyes:      Conjunctiva/sclera: Conjunctivae normal.      Pupils: Pupils are equal, round, and reactive to light.   Cardiovascular:      Rate and Rhythm: Normal rate and regular rhythm.   Pulmonary:      Effort: Pulmonary effort is normal. No respiratory distress.      Breath sounds: Normal breath sounds.   Musculoskeletal:         General: Normal range of motion.      Cervical back: Normal range of motion and neck supple.   Skin:     General: Skin is warm and dry.   Neurological:      Mental Status: She is alert and oriented to person, place, and time.   Psychiatric:         Mood and Affect: Mood normal.          Current Outpatient Medications:     atorvastatin (LIPITOR) 40 MG tablet, Take 1 tablet (40 mg total) by mouth once daily., Disp: 90 tablet, Rfl: 3    levothyroxine (SYNTHROID) 50 MCG tablet, Take 50 mcg by mouth once daily., Disp: , Rfl:     lisinopriL (PRINIVIL,ZESTRIL) 2.5 MG tablet, Take 1 tablet (2.5 mg total) by mouth once daily., Disp: 90 tablet, Rfl: 3    memantine (NAMENDA) 5 MG Tab, Take 1 tablet (5 mg total) by mouth 2 (two) times daily., Disp: 60 tablet, Rfl: 11    metFORMIN (GLUCOPHAGE) 500 MG tablet, Take 500 mg by mouth once daily., Disp: , Rfl:     multivitamin capsule, Take 1 capsule by mouth once daily., Disp: , Rfl:     buPROPion (WELLBUTRIN XL) 300 MG 24 hr tablet, Take 1 tablet (300 mg total) by mouth once daily., Disp: 30 tablet, Rfl: 2    nystatin (MYCOSTATIN) ointment, Apply topically 2 (two) times daily. Apply to skin folds for itching and redness for 14 days (Patient not taking: Reported on 4/13/2023), Disp: 30 g, Rfl: 3    Assessment & Plan:     Problem List Items Addressed This Visit          Psychiatric    Moderate episode of recurrent major depressive disorder - Primary     No improvement  Increase Wellbutrin XL to 300 mg po daily  Instructed to continue to monitor symptoms  Keep appt  with PCP for follow up reeevaluation  Instructed to report to ED for any CP, SOB, suicidal/homicidal ideation, and/or worsening symptoms  Pt is agreeable to plan and verbalizes understanding           Relevant Medications    buPROPion (WELLBUTRIN XL) 300 MG 24 hr tablet

## 2023-05-25 NOTE — ASSESSMENT & PLAN NOTE
No improvement  Increase Wellbutrin XL to 300 mg po daily  Instructed to continue to monitor symptoms  Keep appt with PCP for follow up reeevaluation  Instructed to report to ED for any CP, SOB, suicidal/homicidal ideation, and/or worsening symptoms  Pt is agreeable to plan and verbalizes understanding

## 2023-05-28 LAB — BACTERIA UR CULT: ABNORMAL

## 2023-05-29 RX ORDER — AMOXICILLIN AND CLAVULANATE POTASSIUM 875; 125 MG/1; MG/1
1 TABLET, FILM COATED ORAL EVERY 12 HOURS
Qty: 10 TABLET | Refills: 0 | Status: SHIPPED | OUTPATIENT
Start: 2023-05-29 | End: 2023-06-03

## 2023-06-12 RX ORDER — AMOXICILLIN AND CLAVULANATE POTASSIUM 875; 125 MG/1; MG/1
TABLET, FILM COATED ORAL
Qty: 10 TABLET | Refills: 0 | OUTPATIENT
Start: 2023-06-12

## 2023-06-12 NOTE — TELEPHONE ENCOUNTER
Spoke with pts daughter  They will bring urine to be cultured  I did advise pt will need to be seen  Expressed understanding

## 2023-06-12 NOTE — TELEPHONE ENCOUNTER
----- Message from Jeanie Tobar LPN sent at 6/12/2023 10:14 AM CDT -----  Regarding: FW: med advice  Call pt   Let her know she will need to give another sample and be evaluated  Help her get scheduled  Thanks   ----- Message -----  From: Christie Pastor  Sent: 6/12/2023   9:23 AM CDT  To: T[facility]illes[facility] Staff  Subject: med advice                                       .Type:  Needs Medical Advice    Who Called: Pt's   Symptoms (please be specific):    How long has patient had these symptoms:    Pharmacy name and phone #:    Would the patient rather a call back or a response via [facility]? Call back  Best Call Back Number: [phone]  Additional Information: Pt is still experiencing the same symptoms she was when UTI was first discovered, would like to know if a stronger antibiotic could be called in.

## 2023-06-14 ENCOUNTER — LAB VISIT (OUTPATIENT)
Dept: LAB | Facility: HOSPITAL | Age: 78
End: 2023-06-14
Attending: INTERNAL MEDICINE
Payer: MEDICARE

## 2023-06-14 DIAGNOSIS — R39.9 UTI SYMPTOMS: ICD-10-CM

## 2023-06-14 DIAGNOSIS — R35.0 URINARY FREQUENCY: Primary | ICD-10-CM

## 2023-06-14 DIAGNOSIS — R35.0 URINARY FREQUENCY: ICD-10-CM

## 2023-06-14 PROCEDURE — 87088 URINE BACTERIA CULTURE: CPT

## 2023-06-15 DIAGNOSIS — R39.9 UTI SYMPTOMS: Primary | ICD-10-CM

## 2023-06-15 DIAGNOSIS — R35.0 URINARY FREQUENCY: ICD-10-CM

## 2023-06-15 LAB
BACTERIA UR CULT: ABNORMAL

## 2023-07-11 ENCOUNTER — TELEPHONE (OUTPATIENT)
Dept: FAMILY MEDICINE | Facility: CLINIC | Age: 78
End: 2023-07-11
Payer: MEDICARE

## 2023-07-12 NOTE — TELEPHONE ENCOUNTER
Spoke with pts daughter  She stated that they would like a urine culture to make sure pts uti has resolved  Will come  cups and order

## 2023-07-13 DIAGNOSIS — E78.5 HYPERLIPIDEMIA ASSOCIATED WITH TYPE 2 DIABETES MELLITUS: ICD-10-CM

## 2023-07-13 DIAGNOSIS — E11.69 HYPERLIPIDEMIA ASSOCIATED WITH TYPE 2 DIABETES MELLITUS: ICD-10-CM

## 2023-07-13 RX ORDER — ATORVASTATIN CALCIUM 40 MG/1
TABLET, FILM COATED ORAL
Qty: 90 TABLET | Refills: 3 | Status: SHIPPED | OUTPATIENT
Start: 2023-07-13

## 2023-07-17 PROBLEM — Z00.00 WELLNESS EXAMINATION: Status: RESOLVED | Noted: 2023-04-13 | Resolved: 2023-07-17

## 2023-08-07 ENCOUNTER — TELEPHONE (OUTPATIENT)
Dept: FAMILY MEDICINE | Facility: CLINIC | Age: 78
End: 2023-08-07
Payer: MEDICARE

## 2023-08-07 NOTE — TELEPHONE ENCOUNTER
----- Message from Christie Pastor sent at 8/7/2023  1:59 PM CDT -----  Regarding: Call back  .Type:  Patient Returning Call    Who Called:PT's daughter  Who Left Message for Patient:  Does the patient know what this is regarding?:Home health  Would the patient rather a call back or a response via [facility]? Call back  Best Call Back Number:[phone]  Additional Information: Would like for nurse to f/u regarding some home health doc that was needed.

## 2023-08-07 NOTE — TELEPHONE ENCOUNTER
----- Message from Amelia Dooley sent at 8/7/2023  9:20 AM CDT -----  Regarding: call back  .Type:  Needs Medical Advice    Who Called: pt Daughter  Symptoms (please be specific):    How long has patient had these symptoms:    Pharmacy name and phone #:    Would the patient rather a call back or a response via [facility]? Call back  Best Call Back Number: [phone]  Additional Information: pt is requesting a call back pt needs authorization from  to get home health

## 2023-08-08 ENCOUNTER — TELEPHONE (OUTPATIENT)
Dept: FAMILY MEDICINE | Facility: CLINIC | Age: 78
End: 2023-08-08
Payer: MEDICARE

## 2023-08-08 NOTE — TELEPHONE ENCOUNTER
Spoke with pts daughter  Wanting to get pt referred to HH  Needs appt within 30 days of referral  Pt scheduled with Bernadine on 08.11 @ 10

## 2023-08-08 NOTE — TELEPHONE ENCOUNTER
----- Message from Christie Pastor sent at 8/8/2023 10:47 AM CDT -----  Regarding: Referral  .Type:  Needs Medical Advice    Who Called: Pt's daughter Angie  Symptoms (please be specific):    How long has patient had these symptoms:    Pharmacy name and phone #:    Would the patient rather a call back or a response via [facility]? Call back  Best Call Back Number: [phone]  Additional Information: Call back for a second time needing assistance with getting pt home health services, states that she's already talked with pt's insurance and have 3 companies insurance would approve with there ok from Dr Clarke.

## 2023-08-11 ENCOUNTER — LAB VISIT (OUTPATIENT)
Dept: LAB | Facility: HOSPITAL | Age: 78
End: 2023-08-11
Attending: INTERNAL MEDICINE
Payer: MEDICARE

## 2023-08-11 DIAGNOSIS — R35.0 URINARY FREQUENCY: ICD-10-CM

## 2023-08-11 DIAGNOSIS — R35.0 URINARY FREQUENCY: Primary | ICD-10-CM

## 2023-08-11 DIAGNOSIS — R39.9 UTI SYMPTOMS: ICD-10-CM

## 2023-08-11 PROCEDURE — 87088 URINE BACTERIA CULTURE: CPT

## 2023-08-11 PROCEDURE — 87077 CULTURE AEROBIC IDENTIFY: CPT

## 2023-08-13 LAB — BACTERIA UR CULT: ABNORMAL

## 2023-08-14 RX ORDER — NITROFURANTOIN 25; 75 MG/1; MG/1
100 CAPSULE ORAL 2 TIMES DAILY
Qty: 10 CAPSULE | Refills: 0 | Status: SHIPPED | OUTPATIENT
Start: 2023-08-14 | End: 2023-08-19

## 2023-08-15 ENCOUNTER — OFFICE VISIT (OUTPATIENT)
Dept: FAMILY MEDICINE | Facility: CLINIC | Age: 78
End: 2023-08-15
Payer: MEDICARE

## 2023-08-15 ENCOUNTER — TELEPHONE (OUTPATIENT)
Dept: FAMILY MEDICINE | Facility: CLINIC | Age: 78
End: 2023-08-15

## 2023-08-15 VITALS
SYSTOLIC BLOOD PRESSURE: 127 MMHG | OXYGEN SATURATION: 99 % | HEART RATE: 89 BPM | TEMPERATURE: 98 F | BODY MASS INDEX: 28.76 KG/M2 | DIASTOLIC BLOOD PRESSURE: 82 MMHG | WEIGHT: 152.31 LBS | HEIGHT: 61 IN | RESPIRATION RATE: 19 BRPM

## 2023-08-15 DIAGNOSIS — N39.0 URINARY TRACT INFECTION WITHOUT HEMATURIA, SITE UNSPECIFIED: ICD-10-CM

## 2023-08-15 DIAGNOSIS — E11.59 HYPERTENSION ASSOCIATED WITH TYPE 2 DIABETES MELLITUS: ICD-10-CM

## 2023-08-15 DIAGNOSIS — I15.2 HYPERTENSION ASSOCIATED WITH TYPE 2 DIABETES MELLITUS: ICD-10-CM

## 2023-08-15 DIAGNOSIS — E11.8 TYPE 2 DIABETES WITH COMPLICATION: ICD-10-CM

## 2023-08-15 DIAGNOSIS — R53.1 GENERALIZED WEAKNESS: Primary | ICD-10-CM

## 2023-08-15 PROCEDURE — 1160F RVW MEDS BY RX/DR IN RCRD: CPT | Mod: CPTII,,, | Performed by: NURSE PRACTITIONER

## 2023-08-15 PROCEDURE — 1159F PR MEDICATION LIST DOCUMENTED IN MEDICAL RECORD: ICD-10-PCS | Mod: CPTII,,, | Performed by: NURSE PRACTITIONER

## 2023-08-15 PROCEDURE — 3079F DIAST BP 80-89 MM HG: CPT | Mod: CPTII,,, | Performed by: NURSE PRACTITIONER

## 2023-08-15 PROCEDURE — 3288F PR FALLS RISK ASSESSMENT DOCUMENTED: ICD-10-PCS | Mod: CPTII,,, | Performed by: NURSE PRACTITIONER

## 2023-08-15 PROCEDURE — 1101F PT FALLS ASSESS-DOCD LE1/YR: CPT | Mod: CPTII,,, | Performed by: NURSE PRACTITIONER

## 2023-08-15 PROCEDURE — 1159F MED LIST DOCD IN RCRD: CPT | Mod: CPTII,,, | Performed by: NURSE PRACTITIONER

## 2023-08-15 PROCEDURE — 3074F SYST BP LT 130 MM HG: CPT | Mod: CPTII,,, | Performed by: NURSE PRACTITIONER

## 2023-08-15 PROCEDURE — 3074F PR MOST RECENT SYSTOLIC BLOOD PRESSURE < 130 MM HG: ICD-10-PCS | Mod: CPTII,,, | Performed by: NURSE PRACTITIONER

## 2023-08-15 PROCEDURE — 1101F PR PT FALLS ASSESS DOC 0-1 FALLS W/OUT INJ PAST YR: ICD-10-PCS | Mod: CPTII,,, | Performed by: NURSE PRACTITIONER

## 2023-08-15 PROCEDURE — 99213 OFFICE O/P EST LOW 20 MIN: CPT | Mod: ,,, | Performed by: NURSE PRACTITIONER

## 2023-08-15 PROCEDURE — 3288F FALL RISK ASSESSMENT DOCD: CPT | Mod: CPTII,,, | Performed by: NURSE PRACTITIONER

## 2023-08-15 PROCEDURE — 99213 PR OFFICE/OUTPT VISIT, EST, LEVL III, 20-29 MIN: ICD-10-PCS | Mod: ,,, | Performed by: NURSE PRACTITIONER

## 2023-08-15 PROCEDURE — 1126F PR PAIN SEVERITY QUANTIFIED, NO PAIN PRESENT: ICD-10-PCS | Mod: CPTII,,, | Performed by: NURSE PRACTITIONER

## 2023-08-15 PROCEDURE — 3079F PR MOST RECENT DIASTOLIC BLOOD PRESSURE 80-89 MM HG: ICD-10-PCS | Mod: CPTII,,, | Performed by: NURSE PRACTITIONER

## 2023-08-15 PROCEDURE — 1160F PR REVIEW ALL MEDS BY PRESCRIBER/CLIN PHARMACIST DOCUMENTED: ICD-10-PCS | Mod: CPTII,,, | Performed by: NURSE PRACTITIONER

## 2023-08-15 PROCEDURE — 1126F AMNT PAIN NOTED NONE PRSNT: CPT | Mod: CPTII,,, | Performed by: NURSE PRACTITIONER

## 2023-08-15 NOTE — TELEPHONE ENCOUNTER
called he said he was returning a call from this office and tried calling 3 times. I called [facility] office they didn't call , we didn't call . So I'm leaving this in computer in place of the call. That he returned the call .

## 2023-08-15 NOTE — PROGRESS NOTES
Subjective:      Patient ID: Miladys Smith is a 78 y.o. White female      Chief Complaint: Lab results (UA) and Referral to        Past Medical History:   Diagnosis Date    Blindness of left eye 2003    Constipation due to pain medication 08/23/2013    Formatting of this note might be different from the original. After surgery    Dementia 10/06/2022    Craig (hard of hearing)     Hyperlipidemia associated with type 2 diabetes mellitus 08/23/2013    Osteoporosis     Overactive bladder 10/06/2022    Stage 3 chronic kidney disease due to type 2 diabetes mellitus 10/06/2022    Type 2 diabetes with complication 10/06/2022        HPI  Presents to clinic for HH referral and urine results.      C/O of urinary symptoms. Recent urine culture + UTI.  Rx sent for Macrobid on yesterday.  Pt has not picked up yet.  Denies any fever, chills, flank pain, nausea or vomiting.        C/O of generalized weakness.  Pt needs help with ADL's/IADL's.  Desire referral to  for PT and help with ADL's.      Chronic:    Depression, Dementia: Complaint with Namenda.      DM:  A1c 5.9.  Does not check CBGs.  Patient sees Dr. Peoples and Queenie- was told she has a benign mass behind the left eye, has been blind for 10 years I    HTN:  BP at goal.  Currently taking Lisinopril 2.5 mg PO QD    HLD:  on statin.  No Myalgia.      Hypothyroidism: Compliant with medication, no hot or cold intolerance, no palpitations not on levothyroxine    Osteoporosis: on calcium + vitamin D, Prolia    CKD:  no nsaid use.  GFR >60.          Review of Systems   Constitutional:  Negative for appetite change, chills and fever.        Weakenss   HENT: Negative.     Eyes: Negative.  Negative for discharge and redness.   Respiratory: Negative.  Negative for shortness of breath.    Cardiovascular: Negative.  Negative for chest pain.   Gastrointestinal: Negative.    Endocrine: Negative.    Genitourinary: Negative.    Integumentary:  Negative.   Allergic/Immunologic:  Negative.    Neurological: Negative.    Psychiatric/Behavioral: Negative.     All other systems reviewed and are negative.         Objective:      Vitals:    08/15/23 0848   BP: 127/82   Pulse: 89   Resp: 19   Temp: 97.6 °F (36.4 °C)      Body mass index is 28.78 kg/m².     Physical Exam  Vitals reviewed.   Constitutional:       Appearance: Normal appearance.   HENT:      Head: Normocephalic.      Mouth/Throat:      Mouth: Mucous membranes are moist.   Eyes:      Conjunctiva/sclera: Conjunctivae normal.      Pupils: Pupils are equal, round, and reactive to light.   Cardiovascular:      Rate and Rhythm: Normal rate and regular rhythm.   Pulmonary:      Effort: Pulmonary effort is normal. No respiratory distress.      Breath sounds: Normal breath sounds.   Musculoskeletal:         General: Normal range of motion.      Cervical back: Normal range of motion and neck supple.      Comments: Motor weakness BLE   Skin:     General: Skin is warm and dry.   Neurological:      Mental Status: She is alert and oriented to person, place, and time.   Psychiatric:         Mood and Affect: Mood normal.            Current Outpatient Medications:     atorvastatin (LIPITOR) 40 MG tablet, TAKE 1 TABLET BY MOUTH EVERY DAY, Disp: 90 tablet, Rfl: 3    buPROPion (WELLBUTRIN XL) 300 MG 24 hr tablet, Take 1 tablet (300 mg total) by mouth once daily., Disp: 30 tablet, Rfl: 2    levothyroxine (SYNTHROID) 50 MCG tablet, Take 50 mcg by mouth once daily., Disp: , Rfl:     lisinopriL (PRINIVIL,ZESTRIL) 2.5 MG tablet, Take 1 tablet (2.5 mg total) by mouth once daily., Disp: 90 tablet, Rfl: 3    memantine (NAMENDA) 5 MG Tab, Take 1 tablet (5 mg total) by mouth 2 (two) times daily., Disp: 60 tablet, Rfl: 11    metFORMIN (GLUCOPHAGE) 500 MG tablet, Take 500 mg by mouth once daily., Disp: , Rfl:     multivitamin capsule, Take 1 capsule by mouth once daily., Disp: , Rfl:     nitrofurantoin, macrocrystal-monohydrate, (MACROBID) 100 MG capsule, Take 1  capsule (100 mg total) by mouth 2 (two) times daily. for 5 days, Disp: 10 capsule, Rfl: 0    nystatin (MYCOSTATIN) ointment, Apply topically 2 (two) times daily. Apply to skin folds for itching and redness for 14 days (Patient not taking: Reported on 4/13/2023), Disp: 30 g, Rfl: 3    Assessment & Plan:     Problem List Items Addressed This Visit          Cardiac/Vascular    Hypertension associated with type 2 diabetes mellitus     BP at goal  Continue current medication  Daily BP check; bring log all clinic visits  Instructed to report to ED for any BP greater than 200/100, dizziness, syncope, CP, or SOB  Keep appt. With PCP for follow up  Patient is agreeable to plan and verbalizes understanding             Relevant Orders    Ambulatory referral/consult to Home Health       Renal/    Urinary tract infection without hematuria     Asymptomatic   Instructed to  Rx for Macrobid  Verbalizes understanding            Endocrine    Type 2 diabetes with complication    Relevant Orders    Ambulatory referral/consult to Home Health       Other    Generalized weakness - Primary     Referral placed to  for PT and help with ADLs  Keep appt with PCP for follow up         Relevant Orders    Ambulatory referral/consult to Home Health            Prior to the patient's arrival on the same day, I spent (2) minutes reviewing chart. Once in the exam room with the patient, I spent (16 ) minutes in the room with the member performing a history and exam as well as reviewing the test results and recommendations with the patient. After leaving the exam room, I spent an additional (2 ) minutes completing the electronic health record. The total time spent that day caring for the member is (20 ) minutes, and this time - including the breakdown - is documented in the medical record.

## 2023-08-17 PROCEDURE — G0180 MD CERTIFICATION HHA PATIENT: HCPCS | Mod: ,,, | Performed by: NURSE PRACTITIONER

## 2023-08-17 PROCEDURE — G0180 PR HOME HEALTH MD CERTIFICATION: ICD-10-PCS | Mod: ,,, | Performed by: NURSE PRACTITIONER

## 2023-08-24 ENCOUNTER — DOCUMENT SCAN (OUTPATIENT)
Dept: HOME HEALTH SERVICES | Facility: HOSPITAL | Age: 78
End: 2023-08-24
Payer: MEDICARE

## 2023-08-28 ENCOUNTER — TELEPHONE (OUTPATIENT)
Dept: FAMILY MEDICINE | Facility: CLINIC | Age: 78
End: 2023-08-28
Payer: MEDICARE

## 2023-08-28 NOTE — TELEPHONE ENCOUNTER
----- Message from Amelia Dooley sent at 8/28/2023 10:00 AM CDT -----  Regarding: call back  .Type:  Needs Medical Advice    Who Called: pt   Symptoms (please be specific):    How long has patient had these symptoms:    Pharmacy name and phone #:    Would the patient rather a call back or a response via [facility]? Call back  Best Call Back Number: [phone]  Additional Information: pt is requesting a call back about pt home health services

## 2023-08-29 ENCOUNTER — TELEPHONE (OUTPATIENT)
Dept: FAMILY MEDICINE | Facility: CLINIC | Age: 78
End: 2023-08-29
Payer: MEDICARE

## 2023-08-29 NOTE — TELEPHONE ENCOUNTER
Spouse returned call to office. Explained to spouse that HH was contacted in regards to extension of services. Services are still active and if more assistance is needed with bathing and grooming, advised spouse to contact HH ([facility]) and speak to pts nurse.  Spouse verbalized understood.

## 2023-08-29 NOTE — TELEPHONE ENCOUNTER
Phoned  ([facility]) to give verbal to extend services per spouse. HH stated that services are still being rendered to pt. No extension is needed at this time.  did state that services for PT  on today. Informed  that I will reach out to family to clarify exactly where the extension is needed.   Phoned pts. family. No answer. Left VM for daughter (Zohreh) to contact office to discuss matter.  Waiting on call back.

## 2023-09-01 ENCOUNTER — EXTERNAL HOME HEALTH (OUTPATIENT)
Dept: HOME HEALTH SERVICES | Facility: HOSPITAL | Age: 78
End: 2023-09-01
Payer: MEDICARE

## 2023-10-16 PROCEDURE — G0179 MD RECERTIFICATION HHA PT: HCPCS | Mod: ,,, | Performed by: NURSE PRACTITIONER

## 2023-10-16 PROCEDURE — G0179 PR HOME HEALTH MD RECERTIFICATION: ICD-10-PCS | Mod: ,,, | Performed by: NURSE PRACTITIONER

## 2023-10-23 DIAGNOSIS — E78.5 HYPERLIPIDEMIA ASSOCIATED WITH TYPE 2 DIABETES MELLITUS: ICD-10-CM

## 2023-10-23 DIAGNOSIS — I15.2 HYPERTENSION ASSOCIATED WITH TYPE 2 DIABETES MELLITUS: ICD-10-CM

## 2023-10-23 DIAGNOSIS — E11.22 STAGE 3 CHRONIC KIDNEY DISEASE DUE TO TYPE 2 DIABETES MELLITUS: ICD-10-CM

## 2023-10-23 DIAGNOSIS — E11.59 HYPERTENSION ASSOCIATED WITH TYPE 2 DIABETES MELLITUS: ICD-10-CM

## 2023-10-23 DIAGNOSIS — N18.30 STAGE 3 CHRONIC KIDNEY DISEASE DUE TO TYPE 2 DIABETES MELLITUS: ICD-10-CM

## 2023-10-23 DIAGNOSIS — E11.8 TYPE 2 DIABETES WITH COMPLICATION: Primary | ICD-10-CM

## 2023-10-23 DIAGNOSIS — E03.9 HYPOTHYROIDISM, UNSPECIFIED TYPE: ICD-10-CM

## 2023-10-23 DIAGNOSIS — E11.69 HYPERLIPIDEMIA ASSOCIATED WITH TYPE 2 DIABETES MELLITUS: ICD-10-CM

## 2023-10-30 ENCOUNTER — LAB VISIT (OUTPATIENT)
Dept: LAB | Facility: HOSPITAL | Age: 78
End: 2023-10-30
Attending: INTERNAL MEDICINE
Payer: MEDICARE

## 2023-10-30 ENCOUNTER — OFFICE VISIT (OUTPATIENT)
Dept: FAMILY MEDICINE | Facility: CLINIC | Age: 78
End: 2023-10-30
Payer: MEDICARE

## 2023-10-30 ENCOUNTER — TELEPHONE (OUTPATIENT)
Dept: FAMILY MEDICINE | Facility: CLINIC | Age: 78
End: 2023-10-30

## 2023-10-30 VITALS
HEART RATE: 98 BPM | TEMPERATURE: 98 F | OXYGEN SATURATION: 98 % | RESPIRATION RATE: 16 BRPM | WEIGHT: 157.63 LBS | SYSTOLIC BLOOD PRESSURE: 124 MMHG | HEIGHT: 61 IN | BODY MASS INDEX: 29.76 KG/M2 | DIASTOLIC BLOOD PRESSURE: 76 MMHG

## 2023-10-30 DIAGNOSIS — F33.1 MODERATE EPISODE OF RECURRENT MAJOR DEPRESSIVE DISORDER: ICD-10-CM

## 2023-10-30 DIAGNOSIS — E78.5 HYPERLIPIDEMIA ASSOCIATED WITH TYPE 2 DIABETES MELLITUS: ICD-10-CM

## 2023-10-30 DIAGNOSIS — E11.8 TYPE 2 DIABETES WITH COMPLICATION: Primary | ICD-10-CM

## 2023-10-30 DIAGNOSIS — E03.9 HYPOTHYROIDISM, UNSPECIFIED TYPE: ICD-10-CM

## 2023-10-30 DIAGNOSIS — I15.2 HYPERTENSION ASSOCIATED WITH TYPE 2 DIABETES MELLITUS: ICD-10-CM

## 2023-10-30 DIAGNOSIS — E11.8 TYPE 2 DIABETES WITH COMPLICATION: ICD-10-CM

## 2023-10-30 DIAGNOSIS — E11.69 HYPERLIPIDEMIA ASSOCIATED WITH TYPE 2 DIABETES MELLITUS: ICD-10-CM

## 2023-10-30 DIAGNOSIS — E11.59 HYPERTENSION ASSOCIATED WITH TYPE 2 DIABETES MELLITUS: ICD-10-CM

## 2023-10-30 LAB
ALBUMIN SERPL-MCNC: 3.6 G/DL (ref 3.4–4.8)
ALBUMIN/GLOB SERPL: 1.4 RATIO (ref 1.1–2)
ALP SERPL-CCNC: 39 UNIT/L (ref 40–150)
ALT SERPL-CCNC: 11 UNIT/L (ref 0–55)
AST SERPL-CCNC: 15 UNIT/L (ref 5–34)
BASOPHILS # BLD AUTO: 0.04 X10(3)/MCL
BASOPHILS NFR BLD AUTO: 0.5 %
BILIRUB SERPL-MCNC: 0.8 MG/DL
BUN SERPL-MCNC: 13.3 MG/DL (ref 9.8–20.1)
CALCIUM SERPL-MCNC: 9.5 MG/DL (ref 8.4–10.2)
CHLORIDE SERPL-SCNC: 109 MMOL/L (ref 98–107)
CHOLEST SERPL-MCNC: 204 MG/DL
CHOLEST/HDLC SERPL: 5 {RATIO} (ref 0–5)
CO2 SERPL-SCNC: 27 MMOL/L (ref 23–31)
CREAT SERPL-MCNC: 0.78 MG/DL (ref 0.55–1.02)
EOSINOPHIL # BLD AUTO: 0.15 X10(3)/MCL (ref 0–0.9)
EOSINOPHIL NFR BLD AUTO: 2 %
ERYTHROCYTE [DISTWIDTH] IN BLOOD BY AUTOMATED COUNT: 14.3 % (ref 11.5–17)
EST. AVERAGE GLUCOSE BLD GHB EST-MCNC: 111.2 MG/DL
GFR SERPLBLD CREATININE-BSD FMLA CKD-EPI: >60 MLS/MIN/1.73/M2
GLOBULIN SER-MCNC: 2.6 GM/DL (ref 2.4–3.5)
GLUCOSE SERPL-MCNC: 158 MG/DL (ref 82–115)
HBA1C MFR BLD: 5.5 %
HCT VFR BLD AUTO: 44.5 % (ref 37–47)
HDLC SERPL-MCNC: 39 MG/DL (ref 35–60)
HGB BLD-MCNC: 14.9 G/DL (ref 12–16)
IMM GRANULOCYTES # BLD AUTO: 0.05 X10(3)/MCL (ref 0–0.04)
IMM GRANULOCYTES NFR BLD AUTO: 0.7 %
LDLC SERPL CALC-MCNC: 134 MG/DL (ref 50–140)
LYMPHOCYTES # BLD AUTO: 1.57 X10(3)/MCL (ref 0.6–4.6)
LYMPHOCYTES NFR BLD AUTO: 21.4 %
MCH RBC QN AUTO: 31.2 PG (ref 27–31)
MCHC RBC AUTO-ENTMCNC: 33.5 G/DL (ref 33–36)
MCV RBC AUTO: 93.3 FL (ref 80–94)
MONOCYTES # BLD AUTO: 0.52 X10(3)/MCL (ref 0.1–1.3)
MONOCYTES NFR BLD AUTO: 7.1 %
NEUTROPHILS # BLD AUTO: 5 X10(3)/MCL (ref 2.1–9.2)
NEUTROPHILS NFR BLD AUTO: 68.3 %
NRBC BLD AUTO-RTO: 0 %
PLATELET # BLD AUTO: 219 X10(3)/MCL (ref 130–400)
PMV BLD AUTO: 12.1 FL (ref 7.4–10.4)
POTASSIUM SERPL-SCNC: 3.5 MMOL/L (ref 3.5–5.1)
PROT SERPL-MCNC: 6.2 GM/DL (ref 5.8–7.6)
RBC # BLD AUTO: 4.77 X10(6)/MCL (ref 4.2–5.4)
SODIUM SERPL-SCNC: 145 MMOL/L (ref 136–145)
TRIGL SERPL-MCNC: 156 MG/DL (ref 37–140)
TSH SERPL-ACNC: 1.19 UIU/ML (ref 0.35–4.94)
VLDLC SERPL CALC-MCNC: 31 MG/DL
WBC # SPEC AUTO: 7.33 X10(3)/MCL (ref 4.5–11.5)

## 2023-10-30 PROCEDURE — 80053 COMPREHEN METABOLIC PANEL: CPT

## 2023-10-30 PROCEDURE — 1160F RVW MEDS BY RX/DR IN RCRD: CPT | Mod: CPTII,,, | Performed by: INTERNAL MEDICINE

## 2023-10-30 PROCEDURE — 85025 COMPLETE CBC W/AUTO DIFF WBC: CPT

## 2023-10-30 PROCEDURE — 3288F FALL RISK ASSESSMENT DOCD: CPT | Mod: CPTII,,, | Performed by: INTERNAL MEDICINE

## 2023-10-30 PROCEDURE — 83036 HEMOGLOBIN GLYCOSYLATED A1C: CPT

## 2023-10-30 PROCEDURE — 3288F PR FALLS RISK ASSESSMENT DOCUMENTED: ICD-10-PCS | Mod: CPTII,,, | Performed by: INTERNAL MEDICINE

## 2023-10-30 PROCEDURE — 1159F PR MEDICATION LIST DOCUMENTED IN MEDICAL RECORD: ICD-10-PCS | Mod: CPTII,,, | Performed by: INTERNAL MEDICINE

## 2023-10-30 PROCEDURE — 99214 OFFICE O/P EST MOD 30 MIN: CPT | Mod: ,,, | Performed by: INTERNAL MEDICINE

## 2023-10-30 PROCEDURE — 3078F PR MOST RECENT DIASTOLIC BLOOD PRESSURE < 80 MM HG: ICD-10-PCS | Mod: CPTII,,, | Performed by: INTERNAL MEDICINE

## 2023-10-30 PROCEDURE — 3078F DIAST BP <80 MM HG: CPT | Mod: CPTII,,, | Performed by: INTERNAL MEDICINE

## 2023-10-30 PROCEDURE — 1160F PR REVIEW ALL MEDS BY PRESCRIBER/CLIN PHARMACIST DOCUMENTED: ICD-10-PCS | Mod: CPTII,,, | Performed by: INTERNAL MEDICINE

## 2023-10-30 PROCEDURE — 80061 LIPID PANEL: CPT

## 2023-10-30 PROCEDURE — 3074F SYST BP LT 130 MM HG: CPT | Mod: CPTII,,, | Performed by: INTERNAL MEDICINE

## 2023-10-30 PROCEDURE — 1101F PR PT FALLS ASSESS DOC 0-1 FALLS W/OUT INJ PAST YR: ICD-10-PCS | Mod: CPTII,,, | Performed by: INTERNAL MEDICINE

## 2023-10-30 PROCEDURE — 3074F PR MOST RECENT SYSTOLIC BLOOD PRESSURE < 130 MM HG: ICD-10-PCS | Mod: CPTII,,, | Performed by: INTERNAL MEDICINE

## 2023-10-30 PROCEDURE — 84443 ASSAY THYROID STIM HORMONE: CPT

## 2023-10-30 PROCEDURE — 36415 COLL VENOUS BLD VENIPUNCTURE: CPT

## 2023-10-30 PROCEDURE — 1101F PT FALLS ASSESS-DOCD LE1/YR: CPT | Mod: CPTII,,, | Performed by: INTERNAL MEDICINE

## 2023-10-30 PROCEDURE — 1159F MED LIST DOCD IN RCRD: CPT | Mod: CPTII,,, | Performed by: INTERNAL MEDICINE

## 2023-10-30 PROCEDURE — 99214 PR OFFICE/OUTPT VISIT, EST, LEVL IV, 30-39 MIN: ICD-10-PCS | Mod: ,,, | Performed by: INTERNAL MEDICINE

## 2023-10-30 RX ORDER — BUPROPION HYDROCHLORIDE 450 MG/1
450 TABLET, FILM COATED, EXTENDED RELEASE ORAL DAILY
Qty: 30 TABLET | Refills: 11 | Status: SHIPPED | OUTPATIENT
Start: 2023-10-30 | End: 2023-11-12

## 2023-10-30 RX ORDER — MAGNESIUM 200 MG
TABLET ORAL ONCE
COMMUNITY

## 2023-10-30 NOTE — PROGRESS NOTES
"Subjective:      Patient ID: Miladys Smith is a 78 y.o. female.    Chief Complaint: Pre-op Exam    HPI    Patient is here today for pre op visit  Patient getting some cavities filled soon and needs clearance to proceed with surgery.  She is with her daughter today, reporting overall doing well. Describes that she is very sedentary, does not do much activity, walks room to room watching TV all day, family tries to convince her to go outside or do something else but patient declines. She is not agitated but just does not want to participate in much.       Health Maintenance Due   Topic Date Due    TETANUS VACCINE  Never done    RSV Vaccine (Age 60+) (1 - 1-dose 60+ series) Never done    Shingles Vaccine (2 of 3) 10/15/2013    Eye Exam  07/01/2022    Influenza Vaccine (1) 09/01/2023    COVID-19 Vaccine (3 - 2023-24 season) 09/01/2023    Hemoglobin A1c  10/13/2023     Review of Systems   Constitutional:  Negative for chills and fever.   HENT:  Negative for congestion, sinus pressure and sore throat.    Respiratory:  Negative for cough and shortness of breath.    Cardiovascular:  Negative for chest pain and palpitations.   Gastrointestinal:  Negative for abdominal pain and nausea.   Skin:  Negative for rash.       Objective:     Vitals:    10/30/23 1302   BP: 124/76   BP Location: Left arm   Patient Position: Sitting   BP Method: Medium (Automatic)   Pulse: 98   Resp: 16   Temp: 98.2 °F (36.8 °C)   TempSrc: Temporal   SpO2: 98%   Weight: 71.5 kg (157 lb 9.6 oz)   Height: 5' 1" (1.549 m)     Physical Exam  Vitals and nursing note reviewed.   Constitutional:       Appearance: Normal appearance.   HENT:      Head: Normocephalic and atraumatic.   Eyes:      Pupils: Pupils are equal, round, and reactive to light.   Cardiovascular:      Rate and Rhythm: Normal rate and regular rhythm.   Pulmonary:      Effort: Pulmonary effort is normal.      Breath sounds: Normal breath sounds. No wheezing.   Abdominal:      General: There " is no distension.      Palpations: Abdomen is soft.   Lymphadenopathy:      Cervical: No cervical adenopathy.   Neurological:      Mental Status: She is alert.       Assessment/Plan:     1. Type 2 diabetes with complication  -     Comprehensive Metabolic Panel; Future; Expected date: 10/30/2023  -     Lipid Panel; Future; Expected date: 10/30/2023  -     CBC Auto Differential; Future; Expected date: 10/30/2023  -     Hemoglobin A1C; Future; Expected date: 10/30/2023  -     Urinalysis; Future; Expected date: 10/30/2023  -     Microalbumin/Creatinine Ratio, Urine; Future; Expected date: 10/30/2023  -     TSH; Future; Expected date: 10/30/2023  Stable   Check A1c and Urine MC now  2. Moderate episode of recurrent major depressive disorder  -     buPROPion 450 mg Tb24; Take 450 mg by mouth once daily.  Dispense: 30 tablet; Refill: 11  Patient with some anhedonia  Increase wellbutrin  D/c namenda secondary to some bad dreams she is having  Discussed working on socialization, encouraging some time spent outside in sunlight, change of scenery to help stimulate her  3. Hypothyroidism, unspecified type  -     Comprehensive Metabolic Panel; Future; Expected date: 10/30/2023  -     Lipid Panel; Future; Expected date: 10/30/2023  -     CBC Auto Differential; Future; Expected date: 10/30/2023  -     Hemoglobin A1C; Future; Expected date: 10/30/2023  -     Urinalysis; Future; Expected date: 10/30/2023  -     Microalbumin/Creatinine Ratio, Urine; Future; Expected date: 10/30/2023  -     TSH; Future; Expected date: 10/30/2023  Stable ccm  4. Hypertension associated with type 2 diabetes mellitus  -     Comprehensive Metabolic Panel; Future; Expected date: 10/30/2023  -     Lipid Panel; Future; Expected date: 10/30/2023  -     CBC Auto Differential; Future; Expected date: 10/30/2023  -     Hemoglobin A1C; Future; Expected date: 10/30/2023  -     Urinalysis; Future; Expected date: 10/30/2023  -     Microalbumin/Creatinine Ratio,  Urine; Future; Expected date: 10/30/2023  -     TSH; Future; Expected date: 10/30/2023  Stable ccm  5. Hyperlipidemia associated with type 2 diabetes mellitus  -     Comprehensive Metabolic Panel; Future; Expected date: 10/30/2023  -     Lipid Panel; Future; Expected date: 10/30/2023  -     CBC Auto Differential; Future; Expected date: 10/30/2023  -     Hemoglobin A1C; Future; Expected date: 10/30/2023  -     Urinalysis; Future; Expected date: 10/30/2023  -     Microalbumin/Creatinine Ratio, Urine; Future; Expected date: 10/30/2023  -     TSH; Future; Expected date: 10/30/2023  Stable ccm    Patient is at low risk for a cardiovascular risk in the perioperative period for a low risk type procedure, she may proceed with scheduled surgery.        Follow up in about 6 months (around 4/30/2024) for Medicare Wellness.    This includes face to face time and non-face to face time preparing to see the patient (eg, review of tests), obtaining and/or reviewing separately obtained history, documenting clinical information in the electronic or other health record, independently interpreting results and communicating results to the patient/family/caregiver, or care coordinator.

## 2023-11-02 ENCOUNTER — TELEPHONE (OUTPATIENT)
Dept: FAMILY MEDICINE | Facility: CLINIC | Age: 78
End: 2023-11-02
Payer: MEDICARE

## 2023-11-06 ENCOUNTER — TELEPHONE (OUTPATIENT)
Dept: FAMILY MEDICINE | Facility: CLINIC | Age: 78
End: 2023-11-06
Payer: MEDICARE

## 2023-11-06 NOTE — TELEPHONE ENCOUNTER
----- Message from Hugo Rai sent at 11/2/2023  3:38 PM CDT -----  .Type:  Patient Returning Call    Who Called:pt' s daughter   Who Left Message for Patient:  Does the patient know what this is regarding?:insurance coverage   Would the patient rather a call back or a response via [facility]? Call back   Best Call Back Number [phone]  Additional Information: Pt's daughter states that the insurance does not want to cover buPROPion 450 mg Tb24

## 2023-11-06 NOTE — TELEPHONE ENCOUNTER
Prior authorization completed on rx and approved by insurance. Left message for daughter to return call to office. Keegan

## 2023-11-09 ENCOUNTER — TELEPHONE (OUTPATIENT)
Dept: FAMILY MEDICINE | Facility: CLINIC | Age: 78
End: 2023-11-09
Payer: MEDICARE

## 2023-11-09 DIAGNOSIS — E11.59 HYPERTENSION ASSOCIATED WITH TYPE 2 DIABETES MELLITUS: Primary | ICD-10-CM

## 2023-11-09 DIAGNOSIS — I15.2 HYPERTENSION ASSOCIATED WITH TYPE 2 DIABETES MELLITUS: Primary | ICD-10-CM

## 2023-11-09 RX ORDER — LISINOPRIL 2.5 MG/1
2.5 TABLET ORAL
Qty: 90 TABLET | Refills: 2 | Status: SHIPPED | OUTPATIENT
Start: 2023-11-09

## 2023-11-09 NOTE — TELEPHONE ENCOUNTER
----- Message from Luz Maria Gama sent at 11/9/2023  8:38 AM CST -----  Regarding: Medical Advice  Type:  Needs Medical Advice    Who Called: Zohrehmikaela)    Would the patient rather a call back or a response via [facility]? Call back     Best Call Back Number: [phone]    Additional Information: Zohreh called to discuss the buPROPion 450 mg Tb24.  The insurance  wont cover the 450 mg. per pharmacy. She wanted to discuss more options.

## 2023-11-10 ENCOUNTER — TELEPHONE (OUTPATIENT)
Dept: FAMILY MEDICINE | Facility: CLINIC | Age: 78
End: 2023-11-10
Payer: MEDICARE

## 2023-11-10 NOTE — TELEPHONE ENCOUNTER
I spoke with daughter on the phone and she stated that she came to the office and was given an approval letter for patients rx through [employer]. She stated it isn't covered because she brought the letter to the pharmacy and they stated the rx is still $100. She stated that [employer] didn't approve it. I explained to her that that was the copay for the rx and that it was covered. She continued to say that the rx wasn't covered and that Dr. Clarke should know which medications or covered and the cost. I advised her that different plans cover different medications and that we don't know how much the rx is until the pharmacy runs the rx.    She requesting to speak with provider before they cancel the provider. She is aware Dr. Clarke is out of the office today and could possibly call them Monday. Zohreh made me repeat what she was saying back to her. Her whole tone of voiced in this conversation was not done in a respectful manner. She requested referral to another pcp in [facility] and one who took [employer] insurance. I advised her that pcp's did not refer patients to other pcp's and that she could go to [facility] website and search for a pcp.     Zohreh's request is for the patient to go back to the previous rx of wellbutrin 150mg bid and mamenantine. She stated the previous rx had a $0 out of pocket cost. Please advise. Keegan

## 2023-11-12 RX ORDER — BUPROPION HYDROCHLORIDE 150 MG/1
150 TABLET, EXTENDED RELEASE ORAL 2 TIMES DAILY
Qty: 60 TABLET | Refills: 11 | Status: SHIPPED | OUTPATIENT
Start: 2023-11-12 | End: 2024-11-11

## 2023-11-13 NOTE — TELEPHONE ENCOUNTER
I have signed for the following orders AND/OR meds. Please notify the patient and ask the patient to schedule the testing and/or information about any medications that were sent.    Rx for Wellbutrin changed to  mg po BID  No refill for namenda given that patient, daughter at last OV described no quantifiable changes with medications and that patient reported bad dreams    Please direct patient to PCP's available within our system to schedule future appointments with per her wishes        Medications Ordered This Encounter   Medications    buPROPion (WELLBUTRIN SR) 150 MG TBSR 12 hr tablet     Sig: Take 1 tablet (150 mg total) by mouth 2 (two) times daily.     Dispense:  60 tablet     Refill:  11     No orders of the defined types were placed in this encounter.

## 2023-11-14 ENCOUNTER — TELEPHONE (OUTPATIENT)
Dept: FAMILY MEDICINE | Facility: CLINIC | Age: 78
End: 2023-11-14
Payer: MEDICARE

## 2023-11-16 ENCOUNTER — EXTERNAL HOME HEALTH (OUTPATIENT)
Dept: HOME HEALTH SERVICES | Facility: HOSPITAL | Age: 78
End: 2023-11-16
Payer: MEDICARE

## 2023-11-20 PROBLEM — N39.0 URINARY TRACT INFECTION WITHOUT HEMATURIA: Status: RESOLVED | Noted: 2023-08-15 | Resolved: 2023-11-20

## 2023-12-02 ENCOUNTER — DOCUMENT SCAN (OUTPATIENT)
Dept: HOME HEALTH SERVICES | Facility: HOSPITAL | Age: 78
End: 2023-12-02
Payer: MEDICARE

## 2023-12-11 ENCOUNTER — DOCUMENT SCAN (OUTPATIENT)
Dept: HOME HEALTH SERVICES | Facility: HOSPITAL | Age: 78
End: 2023-12-11
Payer: MEDICARE

## 2023-12-11 ENCOUNTER — TELEPHONE (OUTPATIENT)
Dept: FAMILY MEDICINE | Facility: CLINIC | Age: 78
End: 2023-12-11

## 2023-12-11 DIAGNOSIS — R41.0 CONFUSION: Primary | ICD-10-CM

## 2023-12-11 NOTE — TELEPHONE ENCOUNTER
"Jose Miguel Clarke MD [facility]    11/12/23  9:28 PM  Note  I have signed for the following orders AND/OR meds. Please notify the patient and ask the patient to schedule the testing and/or information about any medications that were sent.     Rx for Wellbutrin changed to  mg po BID  No refill for namenda given that patient, daughter at last OV described no quantifiable changes with medications and that patient reported bad dreams     Please direct patient to PCP's available within our system to schedule future appointments with per her wishes                Medications Ordered This Encounter   Medications    buPROPion (WELLBUTRIN SR) 150 MG TBSR 12 hr tablet       Sig: Take 1 tablet (150 mg total) by mouth 2 (two) times daily.       Dispense:  60 tablet       Refill:  11      No orders of the defined types were placed in this encounter.      I advised her of the conversation about about 4 weeks regarding the meds and her wanting to find a new pcp. She stated she was just asking if Dr. Clarke saw [employer] insurance. I informed her that Dr. Clarke does take [employer] insurance. She stated that Dr. Clarke should know which meds are covered and I explained to her that all rx's and insurance are different.     I spoke with patients daughter Zohreh and she stated that the patient is talking about "strange men in the home" and looking in the closet to use the bathroom. She is requesting patient be put back on mamentine because she is deteriorating. She stated she is still having bad dreams that are worse and no quantifiable changes. I advised her that patient would need to be seen to be evaluated due to the changes she is having. I advised her that patient hasn't seen Dr. Clarke in 6 weeks. She was displaying inappropriate behavior over the phone. I did offer for the patient to see our NP on 1.4.24 and she didn't agree.     She kept mentioning that they may have to switch physicians.     She was stating that the rx for wellbutrin 450mg " was denied by insurance. I advised her that the rx for wellbutrin 150mg bid. She stated she is taking wellbutrin 150mg daily because that's what the insurance approved (no PA was ever received for the wellbutrin 150mg.    I told her a message would be sent to Dr. Clarke. Please advise. Would you need to see her to be evaluated?    Keegan

## 2023-12-12 ENCOUNTER — LAB REQUISITION (OUTPATIENT)
Dept: LAB | Facility: HOSPITAL | Age: 78
End: 2023-12-12
Payer: MEDICARE

## 2023-12-12 DIAGNOSIS — N39.0 URINARY TRACT INFECTION, SITE NOT SPECIFIED: ICD-10-CM

## 2023-12-12 LAB
APPEARANCE UR: CLEAR
BACTERIA #/AREA URNS AUTO: ABNORMAL /HPF
BILIRUB UR QL STRIP.AUTO: NEGATIVE
COLOR UR AUTO: ABNORMAL
GLUCOSE UR QL STRIP.AUTO: NORMAL
KETONES UR QL STRIP.AUTO: NEGATIVE
LEUKOCYTE ESTERASE UR QL STRIP.AUTO: NEGATIVE
MUCOUS THREADS URNS QL MICRO: ABNORMAL /LPF
NITRITE UR QL STRIP.AUTO: NEGATIVE
PH UR STRIP.AUTO: 5.5 [PH]
PROT UR QL STRIP.AUTO: NEGATIVE
RBC #/AREA URNS AUTO: ABNORMAL /HPF
RBC UR QL AUTO: NEGATIVE
SP GR UR STRIP.AUTO: 1.01 (ref 1–1.03)
SQUAMOUS #/AREA URNS LPF: ABNORMAL /HPF
UROBILINOGEN UR STRIP-ACNC: NORMAL
WBC #/AREA URNS AUTO: ABNORMAL /HPF

## 2023-12-12 PROCEDURE — 87086 URINE CULTURE/COLONY COUNT: CPT | Performed by: INTERNAL MEDICINE

## 2023-12-12 PROCEDURE — 81001 URINALYSIS AUTO W/SCOPE: CPT | Performed by: INTERNAL MEDICINE

## 2023-12-12 NOTE — TELEPHONE ENCOUNTER
Patient needs UA with culture ordered and a telemed   Please add her on to schedule and lets get urine sample  Do they have home health available?  If not,t hen we can order it  [Xenia f/u and let me know

## 2023-12-12 NOTE — TELEPHONE ENCOUNTER
I spoke with Tata at [facility] and advised them that patient needs a u/a with culture, and also that I would send them the order. They can collect it today or tomorrow. Order faxed.     Per Tata patient is scheduled to be d/c from home health on 12.14.23.      I called patients daughter Zohreh and no answer. Left message to return call. Keegan

## 2023-12-14 LAB — BACTERIA UR CULT: NO GROWTH

## 2023-12-14 NOTE — TELEPHONE ENCOUNTER
Spoke with Emerald at [facility]   She stated that urine was collected on 12.13   She also stated that they have not received the results  She advised she will call and follow up

## 2023-12-18 ENCOUNTER — TELEPHONE (OUTPATIENT)
Dept: FAMILY MEDICINE | Facility: CLINIC | Age: 78
End: 2023-12-18
Payer: MEDICARE

## 2023-12-18 DIAGNOSIS — F03.90 DEMENTIA, UNSPECIFIED DEMENTIA SEVERITY, UNSPECIFIED DEMENTIA TYPE, UNSPECIFIED WHETHER BEHAVIORAL, PSYCHOTIC, OR MOOD DISTURBANCE OR ANXIETY: ICD-10-CM

## 2023-12-18 DIAGNOSIS — E11.8 TYPE 2 DIABETES WITH COMPLICATION: Primary | ICD-10-CM

## 2023-12-18 NOTE — TELEPHONE ENCOUNTER
Urine sample does not show signs of UTI  If patient continues to feel fatigued, not well, then patient likely needs telemed or in person visit

## 2023-12-20 RX ORDER — MEMANTINE HYDROCHLORIDE 5 MG/1
5 TABLET ORAL DAILY
Qty: 30 TABLET | Refills: 11 | Status: SHIPPED | OUTPATIENT
Start: 2023-12-20 | End: 2024-01-02 | Stop reason: SDUPTHER

## 2023-12-20 NOTE — TELEPHONE ENCOUNTER
Spoke with pts daughter  Results reviewed  She stated that pt is feeling better   Would like more home health ordered to help with showering and also pt to be put back on memantine  Would be ok for telemed visit  If you agree with telemed where/when would you like to put her  Please advise  Thanks

## 2023-12-20 NOTE — TELEPHONE ENCOUNTER
Please call pts daughter/ to schedule pt for telemed  Next available in January ok per Dr Clarke  Thanks

## 2023-12-20 NOTE — TELEPHONE ENCOUNTER
Spoke with pts    Advised of referral, medication & appt needed  Advised that front staff will contact with appt date and time  Expressed understanding

## 2023-12-20 NOTE — TELEPHONE ENCOUNTER
Two referrals placed  For home health to provide assistance  For outpatient case management to review with patient and her family additional support options in the home [facility]  Rx namenda 5 mg po daily sent in  Please schedule next available appt kervin, Allen sr    thanks

## 2024-01-02 ENCOUNTER — OFFICE VISIT (OUTPATIENT)
Dept: FAMILY MEDICINE | Facility: CLINIC | Age: 79
End: 2024-01-02
Payer: MEDICARE

## 2024-01-02 ENCOUNTER — TELEPHONE (OUTPATIENT)
Dept: FAMILY MEDICINE | Facility: CLINIC | Age: 79
End: 2024-01-02

## 2024-01-02 DIAGNOSIS — F03.90 DEMENTIA, UNSPECIFIED DEMENTIA SEVERITY, UNSPECIFIED DEMENTIA TYPE, UNSPECIFIED WHETHER BEHAVIORAL, PSYCHOTIC, OR MOOD DISTURBANCE OR ANXIETY: ICD-10-CM

## 2024-01-02 PROCEDURE — 99213 OFFICE O/P EST LOW 20 MIN: CPT | Mod: 95,,, | Performed by: INTERNAL MEDICINE

## 2024-01-02 PROCEDURE — 1159F MED LIST DOCD IN RCRD: CPT | Mod: CPTII,95,, | Performed by: INTERNAL MEDICINE

## 2024-01-02 PROCEDURE — 1101F PT FALLS ASSESS-DOCD LE1/YR: CPT | Mod: CPTII,95,, | Performed by: INTERNAL MEDICINE

## 2024-01-02 PROCEDURE — 3288F FALL RISK ASSESSMENT DOCD: CPT | Mod: CPTII,95,, | Performed by: INTERNAL MEDICINE

## 2024-01-02 PROCEDURE — 1160F RVW MEDS BY RX/DR IN RCRD: CPT | Mod: CPTII,95,, | Performed by: INTERNAL MEDICINE

## 2024-01-02 RX ORDER — MEMANTINE HYDROCHLORIDE 5 MG/1
5 TABLET ORAL DAILY
Qty: 30 TABLET | Refills: 11 | Status: SHIPPED | OUTPATIENT
Start: 2024-01-02 | End: 2025-01-01

## 2024-01-02 NOTE — PROGRESS NOTES
TELEMEDICINE VISIT     Patient ID: Miladys Smith is a 78 y.o. female.  MRN: [id number]  : 1945    Subjective:        TELEMEDICINE  The patient location is: home  The chief complaint leading to consultation is: Dementia     Visit type: Virtual visit with synchronous audio and video  Of note, no video access today    Total time spent with patient: 15 minutes  20 minutes of total time spent on the encounter, which includes face to face time and non-face to face time preparing to see the patient (eg, review of tests), obtaining and/or reviewing separately obtained history, documenting clinical information in the electronic or other health record, independently interpreting results (not separately reported) and communicating results to the patient/family/caregiver, or care coordination (not separately reported).    Each patient to whom he or she provides medical services by telemedicine is:  (1) informed of the relationship between the physician and patient and the respective role of any other health care provider with respect to management of the patient; and (2) notified that he or she may decline to receive medical services by telemedicine and may withdraw from such care at any time.    HPI: HPI     I was on telemed visit today with patient daughter Zohreh and her  and primary caregiver Elias. Patient daughter reported more confusion several weeks ago, at that time declined appt, urine sample obtained did not show a UTI, patient daughter wanted to restart namenda as she found that it did make a difference. Patient behavior is described as the same, she sleeps a lot, does not do much, eats a good diet, does use diaper. We did order HH to start, family wants to add more bathing times for HH orders. Also they have not heard from our .     Health maintenance reviewed with the patient.  Health maintenance completed:  Health Maintenance Topics with due status: Not Due       Topic Last Completion Date     DEXA Scan 10/25/2021    Diabetes Urine Screening 05/25/2023    Lipid Panel 10/30/2023    Hemoglobin A1c 10/30/2023      Health maintenance due:  Health Maintenance Due   Topic Date Due    TETANUS VACCINE  Never done    RSV Vaccine (Age 60+ and Pregnant patients) (1 - 1-dose 60+ series) Never done    Shingles Vaccine (2 of 3) 10/15/2013    Eye Exam  07/01/2022    Influenza Vaccine (1) 09/01/2023    COVID-19 Vaccine (3 - 2023-24 season) 09/01/2023      ROS:  Review of Systems   Constitutional:  Negative for activity change, appetite change, chills and fever.   Respiratory:  Negative for shortness of breath.    Cardiovascular:  Negative for chest pain.      History:     Past Medical History:   Diagnosis Date    Blindness of left eye 2003    Constipation due to pain medication 08/23/2013    Formatting of this note might be different from the original. After surgery    Dementia 10/06/2022    [place] (hard of hearing)     Hyperlipidemia associated with type 2 diabetes mellitus 08/23/2013    Osteoporosis     Overactive bladder 10/06/2022    Stage 3 chronic kidney disease due to type 2 diabetes mellitus 10/06/2022    Type 2 diabetes with complication 10/06/2022      Past Surgical History:   Procedure Laterality Date    TOTAL KNEE ARTHROPLASTY Right      History reviewed. No pertinent family history.   Social History     Tobacco Use    Smoking status: Never    Smokeless tobacco: Never   Substance and Sexual Activity    Alcohol use: Not Currently    Drug use: Never    Sexual activity: Not on file          Allergies: Review of patient's allergies indicates:  No Known Allergies  Objective:   There were no vitals filed for this visit.      Physical Examination: Physical exam was not performed during this virtual visit.  Physical Exam    Labs:   Diagnostic Tests:  Significant Imaging: I have reviewed and interpreted all pertinent imaging results/findings.  CT Abdomen Pelvis With Contrast  CT of the abdomen pelvis with intravenous  contrast     INDICATION: Decreased appetite, dehydration, nausea     TECHNIQUE: Routine CT of the abdomen pelvis performed with intravenous  contrast     Automatic exposure control was utilized to reduce patient's radiation  dose.     Total DLP: 843 mGy.cm     COMPARISON: None     FINDINGS: Visualized lung bases are clear. There is a 7 mm cyst in the  left lower liver. The spleen, pancreas, gallbladder, right adrenal  gland, and kidneys appear unremarkable. There is adreniform  enlargement of the left adrenal gland likely reflecting hyperplasia.  The abdominal trauma caliber with moderate atherosclerotic  calcification seen. No abdominal pelvic adenopathy seen. There is  fat-containing small umbilical hernia. The uterus is surgically  absent. The bladder contours are normal. No free air, free fluid, or  fluid collection. There is a small moderate amount of retained stool  in the colon. There is partially seen large hiatal hernia with  intrathoracic stomach noted. There is no bowel obstruction or bowel  inflammatory changes.     IMPRESSION: No acute intra-abdominal intrapelvic abnormality.     Large hiatal hernia with partially seen intrathoracic stomach noted.     Additional details as above      Electronically Signed By: Demetri Ramirez MD  Date/Time Signed: 04/09/2020 20:00  CT Head Without Contrast  CT of the head without contrast     [id number]     INDICATION: Decreased appetite, headaches     TECHNIQUE: Routine CT of the head was performed without contrast     Automatic exposure control was utilized to reduce patient's radiation  dose.     Total DLP: 1030 mGy.cm     COMPARISON: 2/25/2013     FINDINGS:   The ventricles and sulci are diffusely proportionately prominent  compatible with moderate involutional changes. There are mild/moderate  patchy areas of decreased attenuation in the periventricular and  subcortical white matter, while nonspecific, most commonly sequelae of  chronic microvascular ischemia. No  acute hemorrhage, midline shift,  mass effect, or extra-axial collection. No sulcal effacement. There is  remote left subinsular remote lacunar infarct. Intracranial vascular  calcifications seen. Visualized osseous structures appear intact.  There is small probable sebaceous cyst within the right frontal scalp.  Visualized paranasal sinuses are clear. There is diffuse opacification  of the right mastoid air cells     IMPRESSION: No acute intracranial abnormality.     Nonspecific large right mastoid air cell effusion.     Chronic changes as above        Electronically Signed By: Demetri Ramirez MD  Date/Time Signed: 04/09/2020 19:48      Laboratory Data:  All pertinent labs have been reviewed.  I have reviewed the following records today:     Details:   [x] Labs [] Internal  [] External    [] Micro [] Internal  [] External    [] Pathology [] Internal  [] External    [x] Imaging [] Internal  [] External    [x] Cardiology Procedures [] Internal  [] External    [x] Provider Records [] Internal  [] External    [] Other [] Internal  [] External      Labs:   Lab Results   Component Value Date    WBC 7.33 10/30/2023    RBC 4.77 10/30/2023    HGB 14.9 10/30/2023    HCT 44.5 10/30/2023    MCV 93.3 10/30/2023    MCH 31.2 (H) 10/30/2023     10/30/2023     10/30/2023    K 3.5 10/30/2023    BUN 13.3 10/30/2023    CREATININE 0.78 10/30/2023    AST 15 10/30/2023    ALT 11 10/30/2023    BILITOT 0.8 10/30/2023    TSH 1.186 10/30/2023    HGBA1C 5.5 10/30/2023      Medications:     Medication List with Changes/Refills   Current Medications    ATORVASTATIN (LIPITOR) 40 MG TABLET    TAKE 1 TABLET BY MOUTH EVERY DAY    BUPROPION (WELLBUTRIN SR) 150 MG TBSR 12 HR TABLET    Take 1 tablet (150 mg total) by mouth 2 (two) times daily.    LEVOTHYROXINE (SYNTHROID) 50 MCG TABLET    Take 50 mcg by mouth once daily.    LISINOPRIL (PRINIVIL,ZESTRIL) 2.5 MG TABLET    TAKE 1 TABLET BY MOUTH EVERY DAY    MAGNESIUM 200 MG TAB     Take by mouth once.    METFORMIN (GLUCOPHAGE) 500 MG TABLET    Take 500 mg by mouth once daily.    MULTIVITAMIN CAPSULE    Take 1 capsule by mouth once daily.   Changed and/or Refilled Medications    Modified Medication Previous Medication    MEMANTINE (NAMENDA) 5 MG TAB memantine (NAMENDA) 5 MG Tab       Take 1 tablet (5 mg total) by mouth once daily.    Take 1 tablet (5 mg total) by mouth once daily.     Assessment:     1. Dementia, unspecified dementia severity, unspecified dementia type, unspecified whether behavioral, psychotic, or mood disturbance or anxiety      Plan:   Miladys was seen today for dementia.    Diagnoses and all orders for this visit:    Dementia, unspecified dementia severity, unspecified dementia type, unspecified whether behavioral, psychotic, or mood disturbance or anxiety  -     memantine (NAMENDA) 5 MG Tab; Take 1 tablet (5 mg total) by mouth once daily.    Patient daughter/spouse describing chronic dementia with some deterioration  Reordered namenda  Advised nurse to f/u on Rx as it was called in last month  Will notify HH to order once a week bathing per family request  Will need to f/u with  regarding other outreach options for family    Follow Up:   No follow-ups on file.    I spent greater than 20 minutes today both in chart review and greater than 50% of that time in discussion with the patient regarding health maintenance, diagnoses, diagnostic tests, medications, treatments, symptom management, expected results and adverse effects. Patient verbalized understanding and all questions were answered.

## 2024-01-02 NOTE — TELEPHONE ENCOUNTER
Angela Agee  I did order outpatient  to reach out to this family regarding additional community resources they may need to care for patient who is at home with dementia. Can you follow up to ensure they do contact patient family  thanks

## 2024-01-03 NOTE — TELEPHONE ENCOUNTER
Spoke with Kaden  Stated never received referral  Referral resent by Amy Alfonso wrote on referral to add HH aide for weekly bathing  Thanks

## 2024-01-04 ENCOUNTER — PATIENT OUTREACH (OUTPATIENT)
Dept: ADMINISTRATIVE | Facility: OTHER | Age: 79
End: 2024-01-04
Payer: MEDICARE

## 2024-01-09 PROCEDURE — G0180 MD CERTIFICATION HHA PATIENT: HCPCS | Mod: ,,, | Performed by: INTERNAL MEDICINE

## 2024-01-10 ENCOUNTER — DOCUMENT SCAN (OUTPATIENT)
Dept: HOME HEALTH SERVICES | Facility: HOSPITAL | Age: 79
End: 2024-01-10
Payer: MEDICARE

## 2024-01-31 ENCOUNTER — EXTERNAL HOME HEALTH (OUTPATIENT)
Dept: HOME HEALTH SERVICES | Facility: HOSPITAL | Age: 79
End: 2024-01-31
Payer: MEDICARE

## 2024-02-17 ENCOUNTER — DOCUMENT SCAN (OUTPATIENT)
Dept: HOME HEALTH SERVICES | Facility: HOSPITAL | Age: 79
End: 2024-02-17
Payer: MEDICARE

## 2024-02-22 ENCOUNTER — LAB VISIT (OUTPATIENT)
Dept: LAB | Facility: HOSPITAL | Age: 79
End: 2024-02-22
Attending: INTERNAL MEDICINE
Payer: MEDICARE

## 2024-02-22 ENCOUNTER — OFFICE VISIT (OUTPATIENT)
Dept: FAMILY MEDICINE | Facility: CLINIC | Age: 79
End: 2024-02-22
Payer: MEDICARE

## 2024-02-22 VITALS
HEART RATE: 108 BPM | DIASTOLIC BLOOD PRESSURE: 76 MMHG | HEIGHT: 61 IN | RESPIRATION RATE: 16 BRPM | SYSTOLIC BLOOD PRESSURE: 136 MMHG | BODY MASS INDEX: 28.25 KG/M2 | WEIGHT: 149.63 LBS | OXYGEN SATURATION: 97 %

## 2024-02-22 DIAGNOSIS — E78.5 HYPERLIPIDEMIA ASSOCIATED WITH TYPE 2 DIABETES MELLITUS: ICD-10-CM

## 2024-02-22 DIAGNOSIS — E11.69 HYPERLIPIDEMIA ASSOCIATED WITH TYPE 2 DIABETES MELLITUS: ICD-10-CM

## 2024-02-22 DIAGNOSIS — E03.9 HYPOTHYROIDISM, UNSPECIFIED TYPE: ICD-10-CM

## 2024-02-22 DIAGNOSIS — I65.23 BILATERAL CAROTID ARTERY STENOSIS: ICD-10-CM

## 2024-02-22 DIAGNOSIS — E11.8 TYPE 2 DIABETES WITH COMPLICATION: Primary | ICD-10-CM

## 2024-02-22 DIAGNOSIS — E11.59 HYPERTENSION ASSOCIATED WITH TYPE 2 DIABETES MELLITUS: ICD-10-CM

## 2024-02-22 DIAGNOSIS — F33.1 MODERATE EPISODE OF RECURRENT MAJOR DEPRESSIVE DISORDER: ICD-10-CM

## 2024-02-22 DIAGNOSIS — N18.30 STAGE 3 CHRONIC KIDNEY DISEASE DUE TO TYPE 2 DIABETES MELLITUS: ICD-10-CM

## 2024-02-22 DIAGNOSIS — F03.90 DEMENTIA, UNSPECIFIED DEMENTIA SEVERITY, UNSPECIFIED DEMENTIA TYPE, UNSPECIFIED WHETHER BEHAVIORAL, PSYCHOTIC, OR MOOD DISTURBANCE OR ANXIETY: ICD-10-CM

## 2024-02-22 DIAGNOSIS — E11.8 TYPE 2 DIABETES WITH COMPLICATION: ICD-10-CM

## 2024-02-22 DIAGNOSIS — R35.0 URINARY FREQUENCY: ICD-10-CM

## 2024-02-22 DIAGNOSIS — E11.22 STAGE 3 CHRONIC KIDNEY DISEASE DUE TO TYPE 2 DIABETES MELLITUS: ICD-10-CM

## 2024-02-22 DIAGNOSIS — M81.0 AGE-RELATED OSTEOPOROSIS WITHOUT CURRENT PATHOLOGICAL FRACTURE: ICD-10-CM

## 2024-02-22 DIAGNOSIS — I15.2 HYPERTENSION ASSOCIATED WITH TYPE 2 DIABETES MELLITUS: ICD-10-CM

## 2024-02-22 PROBLEM — R53.1 GENERALIZED WEAKNESS: Status: RESOLVED | Noted: 2023-08-15 | Resolved: 2024-02-22

## 2024-02-22 LAB
ALBUMIN SERPL-MCNC: 3.8 G/DL (ref 3.4–4.8)
ALBUMIN/GLOB SERPL: 1.3 RATIO (ref 1.1–2)
ALP SERPL-CCNC: 56 UNIT/L (ref 40–150)
ALT SERPL-CCNC: 10 UNIT/L (ref 0–55)
AST SERPL-CCNC: 17 UNIT/L (ref 5–34)
BASOPHILS # BLD AUTO: 0.05 X10(3)/MCL
BASOPHILS NFR BLD AUTO: 0.7 %
BILIRUB SERPL-MCNC: 0.8 MG/DL
BUN SERPL-MCNC: 14.3 MG/DL (ref 9.8–20.1)
CALCIUM SERPL-MCNC: 9.7 MG/DL (ref 8.4–10.2)
CHLORIDE SERPL-SCNC: 107 MMOL/L (ref 98–107)
CHOLEST SERPL-MCNC: 216 MG/DL
CHOLEST/HDLC SERPL: 5 {RATIO} (ref 0–5)
CO2 SERPL-SCNC: 25 MMOL/L (ref 23–31)
CREAT SERPL-MCNC: 0.86 MG/DL (ref 0.55–1.02)
EOSINOPHIL # BLD AUTO: 0.19 X10(3)/MCL (ref 0–0.9)
EOSINOPHIL NFR BLD AUTO: 2.6 %
ERYTHROCYTE [DISTWIDTH] IN BLOOD BY AUTOMATED COUNT: 13.7 % (ref 11.5–17)
EST. AVERAGE GLUCOSE BLD GHB EST-MCNC: 108.3 MG/DL
GFR SERPLBLD CREATININE-BSD FMLA CKD-EPI: >60 MLS/MIN/1.73/M2
GLOBULIN SER-MCNC: 2.9 GM/DL (ref 2.4–3.5)
GLUCOSE SERPL-MCNC: 134 MG/DL (ref 82–115)
HBA1C MFR BLD: 5.4 %
HCT VFR BLD AUTO: 46.9 % (ref 37–47)
HDLC SERPL-MCNC: 42 MG/DL (ref 35–60)
HGB BLD-MCNC: 16 G/DL (ref 12–16)
IMM GRANULOCYTES # BLD AUTO: 0.04 X10(3)/MCL (ref 0–0.04)
IMM GRANULOCYTES NFR BLD AUTO: 0.5 %
LDLC SERPL CALC-MCNC: 153 MG/DL (ref 50–140)
LYMPHOCYTES # BLD AUTO: 1.69 X10(3)/MCL (ref 0.6–4.6)
LYMPHOCYTES NFR BLD AUTO: 23 %
MCH RBC QN AUTO: 31.1 PG (ref 27–31)
MCHC RBC AUTO-ENTMCNC: 34.1 G/DL (ref 33–36)
MCV RBC AUTO: 91.2 FL (ref 80–94)
MONOCYTES # BLD AUTO: 0.43 X10(3)/MCL (ref 0.1–1.3)
MONOCYTES NFR BLD AUTO: 5.8 %
NEUTROPHILS # BLD AUTO: 4.96 X10(3)/MCL (ref 2.1–9.2)
NEUTROPHILS NFR BLD AUTO: 67.4 %
NRBC BLD AUTO-RTO: 0 %
PLATELET # BLD AUTO: 234 X10(3)/MCL (ref 130–400)
PMV BLD AUTO: 12 FL (ref 7.4–10.4)
POTASSIUM SERPL-SCNC: 3.7 MMOL/L (ref 3.5–5.1)
PROT SERPL-MCNC: 6.7 GM/DL (ref 5.8–7.6)
RBC # BLD AUTO: 5.14 X10(6)/MCL (ref 4.2–5.4)
SODIUM SERPL-SCNC: 141 MMOL/L (ref 136–145)
TRIGL SERPL-MCNC: 103 MG/DL (ref 37–140)
TSH SERPL-ACNC: 0.79 UIU/ML (ref 0.35–4.94)
VLDLC SERPL CALC-MCNC: 21 MG/DL
WBC # SPEC AUTO: 7.36 X10(3)/MCL (ref 4.5–11.5)

## 2024-02-22 PROCEDURE — 83036 HEMOGLOBIN GLYCOSYLATED A1C: CPT

## 2024-02-22 PROCEDURE — 3078F DIAST BP <80 MM HG: CPT | Mod: CPTII,,, | Performed by: INTERNAL MEDICINE

## 2024-02-22 PROCEDURE — 1101F PT FALLS ASSESS-DOCD LE1/YR: CPT | Mod: CPTII,,, | Performed by: INTERNAL MEDICINE

## 2024-02-22 PROCEDURE — 80053 COMPREHEN METABOLIC PANEL: CPT

## 2024-02-22 PROCEDURE — 1159F MED LIST DOCD IN RCRD: CPT | Mod: CPTII,,, | Performed by: INTERNAL MEDICINE

## 2024-02-22 PROCEDURE — 3075F SYST BP GE 130 - 139MM HG: CPT | Mod: CPTII,,, | Performed by: INTERNAL MEDICINE

## 2024-02-22 PROCEDURE — 36415 COLL VENOUS BLD VENIPUNCTURE: CPT

## 2024-02-22 PROCEDURE — 80061 LIPID PANEL: CPT

## 2024-02-22 PROCEDURE — 85025 COMPLETE CBC W/AUTO DIFF WBC: CPT

## 2024-02-22 PROCEDURE — 3288F FALL RISK ASSESSMENT DOCD: CPT | Mod: CPTII,,, | Performed by: INTERNAL MEDICINE

## 2024-02-22 PROCEDURE — 84443 ASSAY THYROID STIM HORMONE: CPT

## 2024-02-22 PROCEDURE — 1160F RVW MEDS BY RX/DR IN RCRD: CPT | Mod: CPTII,,, | Performed by: INTERNAL MEDICINE

## 2024-02-22 PROCEDURE — 99214 OFFICE O/P EST MOD 30 MIN: CPT | Mod: ,,, | Performed by: INTERNAL MEDICINE

## 2024-02-22 NOTE — PROGRESS NOTES
"Subjective:      Patient ID: Miladys Smith is a 79 y.o. female.    Chief Complaint: Diabetes    HPI    Last wellness 04/2023  Patient is present with her  and her daughter Zohreh who are both primary caregivers  Patient baseline now is that she is sedentary, unable to bath or clean herself, she does not prepare meals, she mostly sits/lays down and watches TV during day, has a lot of issues with soiling herself multiple times a day, also the bed, daughter Zohreh expressed a lot of challenges with keeping her clean. They are very adamant to provide in home care only, have also relied on home health to provide some of these services and they have sought out option for add'l home care but report cost is too high. They have no trouble with access to food but it is more direct patient care with ADL's that they need assistance with. Daughter Zohreh was very rude in the office today, when asking me if we can continue home health I reported yes that we would recertify and explained the recertifcation process and daughter rolled her eyes and scolded me that "Can you just speak english? No one knows what you are saying, you don't do what I ask, you need to just do what I am asking you to do." I looked at patient daughter and her father in room, and asked him if he had trouble understanding what I explained and he said no. I told the patient daughter that she is being rude and that is not necessary. I also expressed empathy at each visit/call for challenges with caregiver stress/burden however options are to seek some help or possible NH placement which patient daughter/ say is not possible.  Regarding patient health today, no acute issues reported.    Chronic medical conditions:  Depression, Dementia:  no change in sx  compliant with namenda  Type 2 diabetes:  A1c 5.9  not checking home BG  Diabetic foot exam completed /22/24 ; no DN  Patient sees Dr. 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, " "has been blind for 10 years I nthe L eye and no treatment for this, has cataracts in the R eye, decline add'l f/u  current med: metformin  on lisinopril 2.5 mg PO QD  on statin  BP is at goal  Hyperlipidemia: Compliant with statin therapies, no myalgias  Hypothyroidism: Compliant with medication, no hot or cold intolerance, no palpitations not on levothyroxine  Osteoporosis: on calcium + vitamin D, missed August dose of Prolia  She is no longer on treatment with prolia  Will need to discuss with patient/family at next visit if they would like to opt out of treatment for this problem    CKD 3:  no nsaid use  overdue labs    Mammogram: she declines  Colon cancer screening: declines  DEXA: 10/25/2021 osteoporosis- Prolia via infusion center  PNEUMOVAX: completed 3/18/19  PREVNAR 5/1/2012  high dose influenza vaccine: 10/06/2022   advance directives: none  HCPOA: need records  no glasses, regular eye exam yearly  hearing test: passed whisper test  COVID 19 vaccine: completed [employer]  *reminded patient to get her booster    Health Maintenance Due   Topic Date Due    TETANUS VACCINE  Never done    RSV Vaccine (Age 60+ and Pregnant patients) (1 - 1-dose 60+ series) Never done    Shingles Vaccine (2 of 3) 10/15/2013    Eye Exam  07/01/2022    Influenza Vaccine (1) 09/01/2023    COVID-19 Vaccine (3 - 2023-24 season) 09/01/2023     Review of Systems   Constitutional:  Negative for chills and fever.   Eyes:  Negative for redness.   Genitourinary:  Positive for frequency.       Objective:     Vitals:    02/22/24 0918   BP: 136/76   BP Location: Left arm   Patient Position: Sitting   BP Method: Medium (Automatic)   Pulse: 108   Resp: 16   SpO2: 97%   Weight: 67.9 kg (149 lb 9.6 oz)   Height: 5' 1" (1.549 m)     Physical Exam  Vitals and nursing note reviewed.   Constitutional:       Appearance: Normal appearance.   HENT:      Head: Normocephalic and atraumatic.   Cardiovascular:      Rate and Rhythm: Normal rate and regular " rhythm.   Pulmonary:      Effort: Pulmonary effort is normal. No respiratory distress.      Breath sounds: Normal breath sounds. No wheezing.   Abdominal:      General: There is no distension.      Palpations: Abdomen is soft.      Tenderness: There is no abdominal tenderness.   Musculoskeletal:      Cervical back: Neck supple.   Lymphadenopathy:      Cervical: No cervical adenopathy.   Neurological:      Mental Status: She is alert.     Protective Sensation (w/ 10 gram monofilament):  Right: Intact  Left: Intact    Visual Inspection:  Normal -  Bilateral    Pedal Pulses:   Right: Present  Left: Present    Posterior Tibialis Pulses:   Right:Present  Left: Present    Assessment/Plan:     1. Type 2 diabetes with complication  -     Comprehensive Metabolic Panel; Future; Expected date: 02/22/2024  -     Lipid Panel; Future; Expected date: 02/22/2024  -     CBC Auto Differential; Future; Expected date: 02/22/2024  -     Hemoglobin A1C; Future; Expected date: 02/22/2024  -     Microalbumin/Creatinine Ratio, Urine; Future; Expected date: 02/22/2024  -     TSH; Future; Expected date: 02/22/2024  Unchanged  Continue metformin  Declines eye exam  Foot exam done  Patient should continue low fat ada diet  2. Stage 3 chronic kidney disease due to type 2 diabetes mellitus  -     Comprehensive Metabolic Panel; Future; Expected date: 02/22/2024  -     Lipid Panel; Future; Expected date: 02/22/2024  -     CBC Auto Differential; Future; Expected date: 02/22/2024  -     Hemoglobin A1C; Future; Expected date: 02/22/2024  -     Microalbumin/Creatinine Ratio, Urine; Future; Expected date: 02/22/2024  -     TSH; Future; Expected date: 02/22/2024  Unchanged  Avoid nsaid and daniels 2 inhibitors  Encourage oral water intake increase  3. Hypertension associated with type 2 diabetes mellitus  -     Comprehensive Metabolic Panel; Future; Expected date: 02/22/2024  -     Lipid Panel; Future; Expected date: 02/22/2024  -     CBC Auto Differential;  Future; Expected date: 02/22/2024  -     Hemoglobin A1C; Future; Expected date: 02/22/2024  -     Microalbumin/Creatinine Ratio, Urine; Future; Expected date: 02/22/2024  -     TSH; Future; Expected date: 02/22/2024  Stable BP well controlled  Continue lisinopril  4. Hyperlipidemia associated with type 2 diabetes mellitus  -     Comprehensive Metabolic Panel; Future; Expected date: 02/22/2024  -     Lipid Panel; Future; Expected date: 02/22/2024  -     CBC Auto Differential; Future; Expected date: 02/22/2024  -     Hemoglobin A1C; Future; Expected date: 02/22/2024  -     Microalbumin/Creatinine Ratio, Urine; Future; Expected date: 02/22/2024  -     TSH; Future; Expected date: 02/22/2024  Stable continue statin therapy and low fat ada diet  5. Hypothyroidism, unspecified type  -     Comprehensive Metabolic Panel; Future; Expected date: 02/22/2024  -     Lipid Panel; Future; Expected date: 02/22/2024  -     CBC Auto Differential; Future; Expected date: 02/22/2024  -     Hemoglobin A1C; Future; Expected date: 02/22/2024  -     Microalbumin/Creatinine Ratio, Urine; Future; Expected date: 02/22/2024  -     TSH; Future; Expected date: 02/22/2024  Stable continue levothyroxine  6. Dementia, unspecified dementia severity, unspecified dementia type, unspecified whether behavioral, psychotic, or mood disturbance or anxiety  Unchanged  Patient does require 24/7 care  Family is having a lot of challenges with her care in home, a lot of caregiver stress/burden  They are not open to NH placement  They report having been provided list of caregivers but are not interested as they called a few and the cost was too high    7. Moderate episode of recurrent major depressive disorder  Unchanged  Continue Namenda  Will reach out to family to discuss how patient is using her wellbutrin  8. Bilateral carotid artery stenosis  Stable continue statin therapy  9. Urinary frequency  unchanged  10. Age-related osteoporosis without current  pathological fracture  Patient no longer on treatment for this problem  Will discuss at next OV if patient would like to d/c treatment     3 month f/u   No follow-ups on file.    This includes face to face time and non-face to face time preparing to see the patient (eg, review of tests), obtaining and/or reviewing separately obtained history, documenting clinical information in the electronic or other health record, independently interpreting results and communicating results to the patient/family/caregiver, or care coordinator.

## 2024-03-05 ENCOUNTER — TELEPHONE (OUTPATIENT)
Dept: FAMILY MEDICINE | Facility: CLINIC | Age: 79
End: 2024-03-05
Payer: MEDICARE

## 2024-03-14 ENCOUNTER — DOCUMENT SCAN (OUTPATIENT)
Dept: HOME HEALTH SERVICES | Facility: HOSPITAL | Age: 79
End: 2024-03-14
Payer: MEDICARE

## 2024-05-13 DIAGNOSIS — I15.2 HYPERTENSION ASSOCIATED WITH TYPE 2 DIABETES MELLITUS: ICD-10-CM

## 2024-05-13 DIAGNOSIS — E11.59 HYPERTENSION ASSOCIATED WITH TYPE 2 DIABETES MELLITUS: ICD-10-CM

## 2024-05-13 RX ORDER — LISINOPRIL 2.5 MG/1
2.5 TABLET ORAL
Qty: 90 TABLET | Refills: 2 | Status: SHIPPED | OUTPATIENT
Start: 2024-05-13

## 2024-05-30 ENCOUNTER — OFFICE VISIT (OUTPATIENT)
Dept: FAMILY MEDICINE | Facility: CLINIC | Age: 79
End: 2024-05-30
Payer: MEDICARE

## 2024-05-30 VITALS
WEIGHT: 150.31 LBS | OXYGEN SATURATION: 96 % | RESPIRATION RATE: 18 BRPM | HEIGHT: 61 IN | HEART RATE: 98 BPM | BODY MASS INDEX: 28.38 KG/M2 | DIASTOLIC BLOOD PRESSURE: 82 MMHG | SYSTOLIC BLOOD PRESSURE: 120 MMHG | TEMPERATURE: 98 F

## 2024-05-30 DIAGNOSIS — I15.2 HYPERTENSION ASSOCIATED WITH TYPE 2 DIABETES MELLITUS: ICD-10-CM

## 2024-05-30 DIAGNOSIS — M81.0 AGE-RELATED OSTEOPOROSIS WITHOUT CURRENT PATHOLOGICAL FRACTURE: ICD-10-CM

## 2024-05-30 DIAGNOSIS — E78.5 HYPERLIPIDEMIA ASSOCIATED WITH TYPE 2 DIABETES MELLITUS: ICD-10-CM

## 2024-05-30 DIAGNOSIS — N18.30 STAGE 3 CHRONIC KIDNEY DISEASE DUE TO TYPE 2 DIABETES MELLITUS: ICD-10-CM

## 2024-05-30 DIAGNOSIS — I65.23 BILATERAL CAROTID ARTERY STENOSIS: ICD-10-CM

## 2024-05-30 DIAGNOSIS — E03.9 HYPOTHYROIDISM, UNSPECIFIED TYPE: ICD-10-CM

## 2024-05-30 DIAGNOSIS — E11.22 STAGE 3 CHRONIC KIDNEY DISEASE DUE TO TYPE 2 DIABETES MELLITUS: ICD-10-CM

## 2024-05-30 DIAGNOSIS — E11.69 HYPERLIPIDEMIA ASSOCIATED WITH TYPE 2 DIABETES MELLITUS: ICD-10-CM

## 2024-05-30 DIAGNOSIS — F03.90 DEMENTIA, UNSPECIFIED DEMENTIA SEVERITY, UNSPECIFIED DEMENTIA TYPE, UNSPECIFIED WHETHER BEHAVIORAL, PSYCHOTIC, OR MOOD DISTURBANCE OR ANXIETY: ICD-10-CM

## 2024-05-30 DIAGNOSIS — E11.59 HYPERTENSION ASSOCIATED WITH TYPE 2 DIABETES MELLITUS: ICD-10-CM

## 2024-05-30 DIAGNOSIS — Z00.00 WELLNESS EXAMINATION: Primary | ICD-10-CM

## 2024-05-30 DIAGNOSIS — E11.8 TYPE 2 DIABETES WITH COMPLICATION: ICD-10-CM

## 2024-05-30 PROCEDURE — 3074F SYST BP LT 130 MM HG: CPT | Mod: CPTII,,, | Performed by: INTERNAL MEDICINE

## 2024-05-30 PROCEDURE — 3079F DIAST BP 80-89 MM HG: CPT | Mod: CPTII,,, | Performed by: INTERNAL MEDICINE

## 2024-05-30 PROCEDURE — 3288F FALL RISK ASSESSMENT DOCD: CPT | Mod: CPTII,,, | Performed by: INTERNAL MEDICINE

## 2024-05-30 PROCEDURE — 1160F RVW MEDS BY RX/DR IN RCRD: CPT | Mod: CPTII,,, | Performed by: INTERNAL MEDICINE

## 2024-05-30 PROCEDURE — 1159F MED LIST DOCD IN RCRD: CPT | Mod: CPTII,,, | Performed by: INTERNAL MEDICINE

## 2024-05-30 PROCEDURE — 1101F PT FALLS ASSESS-DOCD LE1/YR: CPT | Mod: CPTII,,, | Performed by: INTERNAL MEDICINE

## 2024-05-30 PROCEDURE — G0439 PPPS, SUBSEQ VISIT: HCPCS | Mod: ,,, | Performed by: INTERNAL MEDICINE

## 2024-05-30 PROCEDURE — 1124F ACP DISCUSS-NO DSCNMKR DOCD: CPT | Mod: CPTII,,, | Performed by: INTERNAL MEDICINE

## 2024-05-30 NOTE — PROGRESS NOTES
Patient ID: [id number]     Chief Complaint: Medicare AWV Follow Up      HPI:     Miladys Smith is a 79 y.o. female here today for a Medicare Wellness.     Patient baseline now is that she is sedentary, unable to bath or clean herself, she does not prepare meals, she mostly sits/lays down and watches TV during day, has a lot of issues with soiling herself multiple times a day, also the bed, daughter Zohreh expressed a lot of challenges with keeping her clean. They are very adamant to provide in home care only, have also relied on home health to provide some of these services and they have sought out option for add'l home care but report cost is too high. They have no trouble with access to food but it is more direct patient care with ADL's that they need assistance with.     Patient presented with spouse today for OV. Patient would turn to her spouse to answer most all questions for her today.     Chronic medical conditions:  Depression, Dementia:  No improvement in sx  Patient is being cared for 24/7 by her  and her daughter  Patient spouse describes that he is struggling to care for her, they are not considering assisted living options for patient, they have not called any caregivers to find out cost/hours per day/assistance  compliant with [employer]  Type 2 diabetes:  A1c shows glycemic control   not checking home BG  Diabetic foot exam completed 2024 ; no DN  Patient sees Dr. 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 and no treatment for this, has cataracts in the R eye, decline add'l f/u  current med: metformin  on lisinopril 2.5 mg PO QD  on statin  BP is at goal  Hyperlipidemia: Compliant with statin therapies, no myalgias  Hypothyroidism: Compliant with medication, no hot or cold intolerance, no palpitations not on levothyroxine  Osteoporosis: on calcium + vitamin D, missed August dose of Prolia  She is no longer on treatment with  prolia  I discussed testing DEXA and continuing prolia injections for patient, they decline as they describe that given her comoribidites it is too challenging to treat her and get tested    CKD 3:  no nsaid use  overdue labs    Mammogram: she declines  Colon cancer screening: declines  DEXA: 10/25/2021 osteoporosis- previously received prolia infusion but family has decided to discontinue due to other comorbidities  PNEUMOVAX: completed 3/18/19  PREVNAR 5/1/2012    advance directives: none        Opioid Screening: Patient medication list reviewed, patient is not taking prescription opioids. Patient is not using additional opioids than prescribed. Patient is at low risk of substance abuse based on this opioid use history.       -------------------------------------    Blindness of left eye    Constipation due to pain medication    Formatting of this note might be different from the original. After surgery    Dementia    [place] (hard of hearing)    Hyperlipidemia associated with type 2 diabetes mellitus    Osteoporosis    Overactive bladder    Stage 3 chronic kidney disease due to type 2 diabetes mellitus    Type 2 diabetes with complication        Past Surgical History:   Procedure Laterality Date    TOTAL KNEE ARTHROPLASTY Right        Review of patient's allergies indicates:  No Known Allergies    Outpatient Medications Marked as Taking for the 5/30/24 encounter (Office Visit) with Jose Miguel Clarke MD   Medication Sig Dispense Refill    atorvastatin (LIPITOR) 40 MG tablet TAKE 1 TABLET BY MOUTH EVERY DAY 90 tablet 3    buPROPion (WELLBUTRIN SR) 150 MG TBSR 12 hr tablet Take 1 tablet (150 mg total) by mouth 2 (two) times daily. 60 tablet 11    levothyroxine (SYNTHROID) 50 MCG tablet Take 50 mcg by mouth once daily.      lisinopriL (PRINIVIL,ZESTRIL) 2.5 MG tablet TAKE 1 TABLET BY MOUTH EVERY DAY 90 tablet 2    magnesium 200 mg Tab Take by mouth once.      memantine (NAMENDA) 5 MG Tab Take 1 tablet (5 mg total) by  mouth once daily. 30 tablet 11    metFORMIN (GLUCOPHAGE) 500 MG tablet Take 500 mg by mouth once daily.      multivitamin capsule Take 1 capsule by mouth once daily.         Social History     Socioeconomic History    Marital status: Significant Other   Tobacco Use    Smoking status: Never    Smokeless tobacco: Never   Substance and Sexual Activity    Alcohol use: Not Currently    Drug use: Never        No family history on file.     Patient Care Team:  Jose Miguel Clarke MD as PCP - General (Internal Medicine)  Jose Miguel Clarke MD       Subjective:     Review of Systems   Constitutional:  Negative for chills and fever.   HENT:  Negative for congestion.    Eyes:  Positive for blurred vision. Negative for pain.   Respiratory:  Negative for cough and shortness of breath.    Cardiovascular:  Negative for chest pain, palpitations and leg swelling.   Gastrointestinal:  Negative for diarrhea.   Genitourinary:  Negative for dysuria.         Patient Reported Health Risk Assessment  What is your age?: 80 or older  Are you male or female?: Female  During the past four weeks, how much have you been bothered by emotional problems such as feeling anxious, depressed, irritable, sad, or downhearted and blue?: Slightly  During the past five weeks, has your physical and/or emotional health limited your social activities with family, friends, neighbors, or groups?: Slightly  During the past four weeks, how much bodily pain have you generally had?: Mild pain  During the past four weeks, was someone available to help if you needed and wanted help?: Yes, as much as I wanted  During the past four weeks, what was the hardest physical activity you could do for at least two minutes?: Very light  Can you get to places out of walking distance without help?  (For example, can you travel alone on buses or taxis, or drive your own car?): No  Can you go shopping for groceries or clothes without someone's help?: Yes  Can you prepare your own meals?:  No  Can you do your own housework without help?: No  Because of any health problems, do you need the help of another person with your personal care needs such as eating, bathing, dressing, or getting around the house?: Yes  Can you handle your own money without help?: No  During the past four weeks, how would you rate your health in general?: Good  How have things been going for you during the past four weeks?: Good and bad parts about equal  Are you having difficulties driving your car?: Not applicable, I do not use a car  Do you always fasten your seat belt when you are in a car?: Yes, usually  How often in the past four weeks have you been bothered by falling or dizzy when standing up?: Seldom  How often in the past four weeks have you been bothered by sexual problems?: Never  How often in the past four weeks have you been bothered by trouble eating well?: Seldom  How often in the past four weeks have you been bothered by teeth or denture problems?: Never  How often in the past four weeks have you been bothered with problems using the telephone?: Seldom  How often in the past four weeks have you been bothered by tiredness or fatigue?: Often  Have you fallen two or more times in the past year?: Yes  Are you afraid of falling?: No  Are you a smoker?: No  During the past four weeks, how many drinks of wine, beer, or other alcoholic beverages did you have?: No alcohol at all  Do you exercise for about 20 minutes three or more days a week?: No, I usually do not exercise this much  Have you been given any information to help you with hazards in your house that might hurt you?: Yes  Have you been given any information to help you with keeping track of your medications?: Yes  How often do you have trouble taking medicines the way you've been told to take them?: I always take them as prescribed  How confident are you that you can control and manage most of your health problems?: Somewhat confident  What is your race?  "(Check all that apply.):     Objective:     /82 (BP Location: Left arm, Patient Position: Sitting, BP Method: Large (Automatic))   Pulse 98   Temp 98.3 °F (36.8 °C) (Temporal)   Resp 18   Ht 5' 1" (1.549 m)   Wt 68.2 kg (150 lb 4.8 oz)   SpO2 96%   BMI 28.40 kg/m²     Physical Exam  Vitals and nursing note reviewed.   Constitutional:       Appearance: Normal appearance.   HENT:      Head: Normocephalic and atraumatic.      Mouth/Throat:      Mouth: Mucous membranes are moist.   Cardiovascular:      Rate and Rhythm: Normal rate and regular rhythm.      Heart sounds: No murmur heard.  Pulmonary:      Effort: Pulmonary effort is normal. No respiratory distress.      Breath sounds: Normal breath sounds. No wheezing.   Abdominal:      General: There is no distension.      Palpations: Abdomen is soft.      Tenderness: There is no abdominal tenderness.   Musculoskeletal:      Cervical back: Neck supple.      Right lower leg: No edema.      Left lower leg: No edema.   Lymphadenopathy:      Cervical: No cervical adenopathy.   Neurological:      Mental Status: She is alert.      Comments: Patient appeared confused  Turning to her spouse to answer most questions for her  At times would answer questions appropriately  Gait with cane is unequal and unbalanced  Patient reportedly walks with walker at home but mostly sedentary                  No data to display                  5/30/2024    11:30 AM 2/22/2024     9:00 AM 1/2/2024    10:15 AM 10/30/2023     1:00 PM 8/15/2023     8:40 AM 5/25/2023     9:00 AM 4/24/2023     1:00 PM   Fall Risk Assessment - Outpatient   Mobility Status Ambulatory w/ assistance Ambulatory Ambulatory Ambulatory w/ assistance Ambulatory w/ assistance Ambulatory w/ assistance Wheelchair Bound   Number of falls 0 0 0 0 0 0 0   Identified as fall risk True False False True True True True           Depression Screening  Over the past two weeks, has the patient felt down, depressed, or " hopeless?: No  Over the past two weeks, has the patient felt little interest or pleasure in doing things?: No  Functional Ability/Safety Screening  Was the patient's timed Up & Go test unsteady or longer than 30 seconds?: Yes  Does the patient need help with phone, transportation, shopping, preparing meals, housework, laundry, meds, or managing money?: Yes  Does the patient's home have rugs in the hallway, lack grab bars in the bathroom, lack handrails on the stairs or have poor lighting?: No  Have you noticed any hearing difficulties?: No  Cognitive Function (Assessed through direct observation with due consideration of information obtained by way of patient reports and/or concerns raised by family, friends, caretakers, or others)    Does the patient repeat questions/statements in the same day?: Yes  Does the patient have trouble remembering the date, year, and time?: Yes  Does the patient have difficulty managing finances?: Yes  Does the patient have a decreased sense of direction?: Yes  Assessment/Plan:     1. Wellness examination  No fasting labs needed at this time  Will need labs before next OV  Declines DEXA  Declines Mammogram  Declines eye exam  2. Type 2 diabetes with complication  Stable  Declines eye exam  Continue low fat ADA diet  3. Hypothyroidism, unspecified type  Stable ccm  4. Age-related osteoporosis without current pathological fracture  Unchanged  Patient declines bone density testing and also treatment  Family who are her primary caregivers decline this as well  5. Stage 3 chronic kidney disease due to type 2 diabetes mellitus  Stable  Avoid NSAID use  Drink more water  6. Hypertension associated with type 2 diabetes mellitus  Stable BP at goal  ccm  7. Hyperlipidemia associated with type 2 diabetes mellitus  Stable no myalgias reported  ccm  8. Bilateral carotid artery stenosis  Stable continue low fat diet and statin therapy  9. Dementia, unspecified dementia severity, unspecified dementia  type, unspecified whether behavioral, psychotic, or mood disturbance or anxiety  Patient spouse describes at times sx of dementia including agitation are present but mostly patient is calm, the family is having more difficulty caring for her, more stress related to this, they are not interested in assisted living and have not pursued finding out about add'l caregiver support for her in the home however we did have our nurse navigator team provide a list of caregivers for them to contact to see if this would be feasible for them         Medicare Annual Wellness and Personalized Prevention Plan:   Fall Risk + Home Safety + Hearing Impairment + Depression Screen + Opioid and Substance Abuse Screening + Cognitive Impairment Screen + Health Risk Assessment all reviewed.     Health Maintenance Topics with due status: Not Due       Topic Last Completion Date    DEXA Scan 10/25/2021    Influenza Vaccine 10/06/2022    Lipid Panel 02/22/2024    Hemoglobin A1c 02/22/2024      The patient's Health Maintenance was reviewed and the following appears to be due at this time:   Health Maintenance Due   Topic Date Due    TETANUS VACCINE  Never done    RSV Vaccine (Age 60+ and Pregnant patients) (1 - 1-dose 60+ series) Never done    Shingles Vaccine (2 of 3) 10/15/2013    Eye Exam  07/01/2022    COVID-19 Vaccine (3 - 2023-24 season) 09/01/2023    Diabetes Urine Screening  05/25/2024       Advance Care Planning     Date: 05/30/2024  Patient did not wish or was not able to name a surrogate decision maker or provide an Advance Care Plan.         Follow up in about 6 months (around 11/30/2024) for Follow Up - Chronic Conditions. In addition to their scheduled follow up, the patient has also been instructed to follow up on as needed basis.

## 2024-06-06 ENCOUNTER — HOSPITAL ENCOUNTER (EMERGENCY)
Facility: HOSPITAL | Age: 79
Discharge: HOME | End: 2024-06-06
Attending: EMERGENCY MEDICINE
Payer: MEDICARE

## 2024-06-06 VITALS
BODY MASS INDEX: 28.32 KG/M2 | HEART RATE: 96 BPM | RESPIRATION RATE: 18 BRPM | WEIGHT: 150 LBS | SYSTOLIC BLOOD PRESSURE: 128 MMHG | OXYGEN SATURATION: 97 % | TEMPERATURE: 98 F | HEIGHT: 61 IN | DIASTOLIC BLOOD PRESSURE: 84 MMHG
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
## 2024-06-06 LAB
ALBUMIN SERPL-MCNC: 3.9 G/DL (ref 3.4–4.8)
ALBUMIN/GLOB SERPL: 1.5 RATIO (ref 1.1–2)
ALP SERPL-CCNC: 62 UNIT/L (ref 40–150)
ALT SERPL-CCNC: 9 UNIT/L (ref 0–55)
ANION GAP SERPL CALC-SCNC: 9 MEQ/L
AST SERPL-CCNC: 16 UNIT/L (ref 5–34)
BASOPHILS # BLD AUTO: 0.06 X10(3)/MCL
BASOPHILS NFR BLD AUTO: 0.5 %
BILIRUB SERPL-MCNC: 0.7 MG/DL
BUN SERPL-MCNC: 14.5 MG/DL (ref 9.8–20.1)
CALCIUM SERPL-MCNC: 9.6 MG/DL (ref 8.4–10.2)
CHLORIDE SERPL-SCNC: 107 MMOL/L (ref 98–107)
CO2 SERPL-SCNC: 26 MMOL/L (ref 23–31)
CREAT SERPL-MCNC: 0.85 MG/DL (ref 0.55–1.02)
CREAT/UREA NIT SERPL: 17
EOSINOPHIL # BLD AUTO: 0.09 X10(3)/MCL (ref 0–0.9)
EOSINOPHIL NFR BLD AUTO: 0.7 %
ERYTHROCYTE [DISTWIDTH] IN BLOOD BY AUTOMATED COUNT: 13.6 % (ref 11.5–17)
GFR SERPLBLD CREATININE-BSD FMLA CKD-EPI: >60 ML/MIN/1.73/M2
GLOBULIN SER-MCNC: 2.6 GM/DL (ref 2.4–3.5)
GLUCOSE SERPL-MCNC: 135 MG/DL (ref 82–115)
HCT VFR BLD AUTO: 45.8 % (ref 37–47)
HGB BLD-MCNC: 15.3 G/DL (ref 12–16)
IMM GRANULOCYTES # BLD AUTO: 0.08 X10(3)/MCL (ref 0–0.04)
IMM GRANULOCYTES NFR BLD AUTO: 0.6 %
LYMPHOCYTES # BLD AUTO: 1.34 X10(3)/MCL (ref 0.6–4.6)
LYMPHOCYTES NFR BLD AUTO: 10.8 %
MCH RBC QN AUTO: 31.2 PG (ref 27–31)
MCHC RBC AUTO-ENTMCNC: 33.4 G/DL (ref 33–36)
MCV RBC AUTO: 93.5 FL (ref 80–94)
MONOCYTES # BLD AUTO: 0.87 X10(3)/MCL (ref 0.1–1.3)
MONOCYTES NFR BLD AUTO: 7 %
NEUTROPHILS # BLD AUTO: 9.95 X10(3)/MCL (ref 2.1–9.2)
NEUTROPHILS NFR BLD AUTO: 80.4 %
NRBC BLD AUTO-RTO: 0 %
PLATELET # BLD AUTO: 239 X10(3)/MCL (ref 130–400)
PMV BLD AUTO: 12.3 FL (ref 7.4–10.4)
POTASSIUM SERPL-SCNC: 3.7 MMOL/L (ref 3.5–5.1)
PROT SERPL-MCNC: 6.5 GM/DL (ref 5.8–7.6)
RBC # BLD AUTO: 4.9 X10(6)/MCL (ref 4.2–5.4)
SODIUM SERPL-SCNC: 142 MMOL/L (ref 136–145)
WBC # SPEC AUTO: 12.39 X10(3)/MCL (ref 4.5–11.5)

## 2024-06-06 PROCEDURE — 85025 COMPLETE CBC W/AUTO DIFF WBC: CPT | Performed by: NURSE PRACTITIONER

## 2024-06-06 PROCEDURE — 80053 COMPREHEN METABOLIC PANEL: CPT | Performed by: NURSE PRACTITIONER

## 2024-06-06 PROCEDURE — 93010 ELECTROCARDIOGRAM REPORT: CPT | Mod: ,,, | Performed by: INTERNAL MEDICINE

## 2024-06-06 PROCEDURE — 99285 EMERGENCY DEPT VISIT HI MDM: CPT | Mod: 25

## 2024-06-06 NOTE — ED PROVIDER NOTES
Encounter Date: 6/6/2024       History     Chief Complaint   Patient presents with    Fall     Presents via [facility] after a slip and fall today/ Patient fell onto her knees and leaned onto the bed. Family wants evaluation due to her dementia. GCS 14, baseline. Unknown med compliance. +eliquis.      See MDM    The history is provided by the patient and the spouse. No  was used.     Review of patient's allergies indicates:  No Known Allergies  Past Medical History:   Diagnosis Date    Blindness of left eye 2003    Constipation due to pain medication 08/23/2013    Formatting of this note might be different from the original. After surgery    Dementia 10/06/2022    [place] (hard of hearing)     Hyperlipidemia associated with type 2 diabetes mellitus 08/23/2013    Osteoporosis     Overactive bladder 10/06/2022    Stage 3 chronic kidney disease due to type 2 diabetes mellitus 10/06/2022    Type 2 diabetes with complication 10/06/2022     Past Surgical History:   Procedure Laterality Date    TOTAL KNEE ARTHROPLASTY Right      No family history on file.  Social History     Tobacco Use    Smoking status: Never    Smokeless tobacco: Never   Substance Use Topics    Alcohol use: Not Currently    Drug use: Never     Review of Systems   Unable to perform ROS: Dementia       Physical Exam     Initial Vitals [06/06/24 1407]   BP Pulse Resp Temp SpO2   131/73 92 18 98.1 °F (36.7 °C) 97 %      MAP       --         Physical Exam    Nursing note and vitals reviewed.  Constitutional: She appears well-developed and well-nourished.   HENT:   Head: Normocephalic.   Eyes: EOM are normal.   Neck:   Normal range of motion.  Cardiovascular:  Normal rate, regular rhythm, normal heart sounds and intact distal pulses.           Pulmonary/Chest: Breath sounds normal. No respiratory distress.   Abdominal: Abdomen is soft. Bowel sounds are normal. There is no abdominal tenderness.   Musculoskeletal:         General: Normal range of  motion.      Cervical back: Normal range of motion.     Neurological: She is alert.   At baseline neuro   Skin: Skin is warm and dry.   Psychiatric: She has a normal mood and affect. Her behavior is normal. Judgment and thought content normal.         ED Course   Procedures  Labs Reviewed   COMPREHENSIVE METABOLIC PANEL - Abnormal; Notable for the following components:       Result Value    Glucose 135 (*)     All other components within normal limits   CBC WITH DIFFERENTIAL - Abnormal; Notable for the following components:    WBC 12.39 (*)     MCH 31.2 (*)     MPV 12.3 (*)     Neut # 9.95 (*)     IG# 0.08 (*)     All other components within normal limits   CBC W/ AUTO DIFFERENTIAL    Narrative:     The following orders were created for panel order CBC Auto Differential.  Procedure                               Abnormality         Status                     ---------                               -----------         ------                     CBC with Differential[[id number]]       Abnormal            Final result                 Please view results for these tests on the individual orders.   URINALYSIS, REFLEX TO URINE CULTURE     EKG Readings: (Independently Interpreted)   Rhythm: Normal Sinus Rhythm. Heart Rate: 83. Ectopy: No Ectopy. Conduction: Normal. ST Segments: Normal ST Segments. T Waves: Normal. Axis: Normal. Clinical Impression: Normal Sinus Rhythm       Imaging Results              X-Ray Chest 1 View (Final result)  Result time 06/06/24 14:52:55      Final result by Amadou Banda MD (06/06/24 14:52:55)                   Impression:      No acute chest disease is identified.    Hiatus hernia      Electronically signed by: Amadou Banda  Date:    06/06/2024  Time:    14:52               Narrative:    EXAMINATION:  XR CHEST 1 VIEW    CLINICAL HISTORY:  , Unspecified fall, initial encounter.    FINDINGS:  No alveolar consolidation, effusion, or pneumothorax is seen.   The thoracic aorta is normal   cardiac silhouette, central pulmonary vessels and mediastinum are normal in size and are grossly unremarkable.   visualized osseous structures are grossly unremarkable.    A hiatus hernia is identified                                       X-Ray Pelvis Routine AP (Final result)  Result time 06/06/24 14:57:12      Final result by Jr Frye MD (06/06/24 14:57:12)                   Impression:      As above.      Electronically signed by: Jr Frye  Date:    06/06/2024  Time:    14:57               Narrative:    EXAMINATION:  XR PELVIS ROUTINE AP    CLINICAL HISTORY:  Fall;    TECHNIQUE:  Single view of the pelvis.    COMPARISON:  No prior imaging available for comparison    FINDINGS:  The bilateral femoral necks appear intact.  Bilateral acetabular osteophytes are noted.  The sacroiliac joints are symmetric.  The pubic symphysis is congruent.                                       Medications - No data to display  Medical Decision Making  Historian:  Patient and spouse.  Patient is a 79-year-old female  that presents with slipped on her bed that has been present today. Associated symptoms denies injury. Surrounding information is patient was wearing socks and her feet slipped causing her to fall onto her bed slowly without injury. Exacerbated by nothing. Relieved by nothing. Patient treatment prior to arrival none. Risk factors include none. Other history pertaining to this complaint nothing.   Assessment:  See physical exam.  DD:  Slip and fall  ED Course: History was obtained.  Physical was performed.  Patient did not sustain any injuries.  I did leave messages for case management to call the  to discuss outpatient home health, rehab, and/or palliative care. Medical or surgical consults:  None. Social determinants that affect healthcare:  None.       Amount and/or Complexity of Data Reviewed  Independent Historian: spouse     Details: Provided information on her medical history and current  illness  Labs:      Details: Labs unremarkable  Radiology:      Details: X-ray showed no acute findings  ECG/medicine tests: independent interpretation performed.     Details: See EKG section                                      Clinical Impression:  Final diagnoses:  [W19.XXXA] Fall          ED Disposition Condition    Discharge Stable          ED Prescriptions    None       Follow-up Information       Follow up With Specialties Details Why Contact Info    Your Primary Care Provider  Call in 3 days ed follow up              Constantine Moreno FNP  06/06/24 4072

## 2024-06-06 NOTE — FIRST PROVIDER EVALUATION
"Medical screening examination initiated.  I have conducted a focused provider triage encounter, findings are as follows:    Brief history of present illness:  EMS reports that patient fell from a chair onto her knees PTA. No LOC. Hx. Of Alzheimer's and family would like patient to be evaluated for her dementia.     Vitals:    06/06/24 1407   BP: 131/73   Pulse: 92   Resp: 18   Temp: 98.1 °F (36.7 °C)   SpO2: 97%   Weight: 68 kg (150 lb)   Height: 5' 1" (1.549 m)       Pertinent physical exam:  Awake, alert    Brief workup plan:  Labs, Imaging    Preliminary workup initiated; this workup will be continued and followed by the physician or advanced practice provider that is assigned to the patient when roomed.  "

## 2024-06-07 ENCOUNTER — DOCUMENTATION ONLY (OUTPATIENT)
Dept: CASE MANAGEMENT | Facility: HOSPITAL | Age: 79
End: 2024-06-07
Payer: MEDICARE

## 2024-06-07 LAB
OHS QRS DURATION: 86 MS
OHS QTC CALCULATION: 444 MS

## 2024-06-07 NOTE — PROGRESS NOTES
Received a note from Constantine DAVIS asking that I contact family to discuss resources and supports. Attempted to call pts  Kali. Left .

## 2024-07-17 ENCOUNTER — TELEPHONE (OUTPATIENT)
Dept: FAMILY MEDICINE | Facility: CLINIC | Age: 79
End: 2024-07-17
Payer: MEDICARE

## 2024-07-17 DIAGNOSIS — F03.90 DEMENTIA, UNSPECIFIED DEMENTIA SEVERITY, UNSPECIFIED DEMENTIA TYPE, UNSPECIFIED WHETHER BEHAVIORAL, PSYCHOTIC, OR MOOD DISTURBANCE OR ANXIETY: Primary | ICD-10-CM

## 2024-07-17 DIAGNOSIS — F41.9 ANXIETY: ICD-10-CM

## 2024-07-17 NOTE — TELEPHONE ENCOUNTER
----- Message from Juliet Arreguin sent at 7/17/2024 10:07 AM CDT -----  .Who Called: Miladys Smith        Preferred Method of Contact: Phone Call  Patient's Preferred Phone Number on File: [phone]   Best Call Back Number, if different:  Additional Information: pt  asking for anxiety meds for the pt before her next appt

## 2024-07-17 NOTE — TELEPHONE ENCOUNTER
Pts  is requesting anxiety meds for the patient for her to come to her next appt  Please advise  Thanks

## 2024-07-18 NOTE — TELEPHONE ENCOUNTER
Given patient with some mild cognitive impairment, I would like to refer patient to our psychiatrist Dr. Bians who can help us identify the best medication to treat her symptoms in light of her memory changes, age and other medical condition  Please let me know if agreeable as I would feel this is the best course moving forward

## 2024-07-18 NOTE — TELEPHONE ENCOUNTER
Spoke with pts  Elias  Advised that Dr Clarke recommends the following:  Given patient with some mild cognitive impairment, I would like to refer patient to our psychiatrist Dr. Bains who can help us identify the best medication to treat her symptoms in light of her memory changes, age and other medical condition   Elias expressed understanding  AMENDABLE WITH REFERRAL  Thanks

## 2024-07-19 NOTE — TELEPHONE ENCOUNTER
I have signed for the following orders AND/OR meds. Please notify the patient and ask the patient to schedule the testing and/or information about any medications that were sent.         Orders Placed This Encounter   Procedures    Ambulatory referral/consult to Psychiatry     Standing Status:   Future     Standing Expiration Date:   8/19/2025     Referral Priority:   Routine     Referral Type:   Psychiatric     Referral Reason:   Specialty Services Required     Referred to Provider:   Jluio César Bains MD     Requested Specialty:   Psychiatry     Number of Visits Requested:   1

## 2024-07-23 ENCOUNTER — TELEPHONE (OUTPATIENT)
Dept: FAMILY MEDICINE | Facility: CLINIC | Age: 79
End: 2024-07-23
Payer: MEDICARE

## 2024-07-23 ENCOUNTER — HOSPITAL ENCOUNTER (EMERGENCY)
Facility: HOSPITAL | Age: 79
Discharge: HOME | End: 2024-07-23
Attending: STUDENT IN AN ORGANIZED HEALTH CARE EDUCATION/TRAINING PROGRAM
Payer: MEDICARE

## 2024-07-23 VITALS
SYSTOLIC BLOOD PRESSURE: 124 MMHG | HEART RATE: 78 BPM | TEMPERATURE: 98 F | BODY MASS INDEX: 28.34 KG/M2 | RESPIRATION RATE: 21 BRPM | WEIGHT: 150 LBS | DIASTOLIC BLOOD PRESSURE: 70 MMHG | OXYGEN SATURATION: 99 %

## 2024-07-23 DIAGNOSIS — F41.9 ANXIETY: Primary | ICD-10-CM

## 2024-07-23 DIAGNOSIS — F03.90 DEMENTIA, UNSPECIFIED DEMENTIA SEVERITY, UNSPECIFIED DEMENTIA TYPE, UNSPECIFIED WHETHER BEHAVIORAL, PSYCHOTIC, OR MOOD DISTURBANCE OR ANXIETY: ICD-10-CM

## 2024-07-23 DIAGNOSIS — D32.9 MENINGIOMA: ICD-10-CM

## 2024-07-23 DIAGNOSIS — E87.6 HYPOKALEMIA: ICD-10-CM

## 2024-07-23 LAB
ALBUMIN SERPL-MCNC: 4 G/DL (ref 3.4–4.8)
ALBUMIN/GLOB SERPL: 1.4 RATIO (ref 1.1–2)
ALP SERPL-CCNC: 68 UNIT/L (ref 40–150)
ALT SERPL-CCNC: 14 UNIT/L (ref 0–55)
ANION GAP SERPL CALC-SCNC: 11 MEQ/L
AST SERPL-CCNC: 22 UNIT/L (ref 5–34)
BASOPHILS # BLD AUTO: 0.06 X10(3)/MCL
BASOPHILS NFR BLD AUTO: 0.5 %
BILIRUB SERPL-MCNC: 1.1 MG/DL
BUN SERPL-MCNC: 12 MG/DL (ref 9.8–20.1)
CALCIUM SERPL-MCNC: 9.9 MG/DL (ref 8.4–10.2)
CHLORIDE SERPL-SCNC: 107 MMOL/L (ref 98–107)
CO2 SERPL-SCNC: 23 MMOL/L (ref 23–31)
CREAT SERPL-MCNC: 0.84 MG/DL (ref 0.55–1.02)
CREAT/UREA NIT SERPL: 14
EOSINOPHIL # BLD AUTO: 0.09 X10(3)/MCL (ref 0–0.9)
EOSINOPHIL NFR BLD AUTO: 0.8 %
ERYTHROCYTE [DISTWIDTH] IN BLOOD BY AUTOMATED COUNT: 14.1 % (ref 11.5–17)
GFR SERPLBLD CREATININE-BSD FMLA CKD-EPI: >60 ML/MIN/1.73/M2
GLOBULIN SER-MCNC: 2.8 GM/DL (ref 2.4–3.5)
GLUCOSE SERPL-MCNC: 124 MG/DL (ref 82–115)
HCT VFR BLD AUTO: 47.8 % (ref 37–47)
HGB BLD-MCNC: 16.1 G/DL (ref 12–16)
IMM GRANULOCYTES # BLD AUTO: 0.08 X10(3)/MCL (ref 0–0.04)
IMM GRANULOCYTES NFR BLD AUTO: 0.7 %
LYMPHOCYTES # BLD AUTO: 1.99 X10(3)/MCL (ref 0.6–4.6)
LYMPHOCYTES NFR BLD AUTO: 17.6 %
MAGNESIUM SERPL-MCNC: 2 MG/DL (ref 1.6–2.6)
MCH RBC QN AUTO: 30.9 PG (ref 27–31)
MCHC RBC AUTO-ENTMCNC: 33.7 G/DL (ref 33–36)
MCV RBC AUTO: 91.7 FL (ref 80–94)
MONOCYTES # BLD AUTO: 1 X10(3)/MCL (ref 0.1–1.3)
MONOCYTES NFR BLD AUTO: 8.8 %
NEUTROPHILS # BLD AUTO: 8.09 X10(3)/MCL (ref 2.1–9.2)
NEUTROPHILS NFR BLD AUTO: 71.6 %
NRBC BLD AUTO-RTO: 0 %
OHS QRS DURATION: 84 MS
OHS QTC CALCULATION: 431 MS
PLATELET # BLD AUTO: 256 X10(3)/MCL (ref 130–400)
PMV BLD AUTO: 12.3 FL (ref 7.4–10.4)
POTASSIUM SERPL-SCNC: 2.9 MMOL/L (ref 3.5–5.1)
PROT SERPL-MCNC: 6.8 GM/DL (ref 5.8–7.6)
RBC # BLD AUTO: 5.21 X10(6)/MCL (ref 4.2–5.4)
SODIUM SERPL-SCNC: 141 MMOL/L (ref 136–145)
WBC # BLD AUTO: 11.31 X10(3)/MCL (ref 4.5–11.5)

## 2024-07-23 PROCEDURE — 85025 COMPLETE CBC W/AUTO DIFF WBC: CPT | Performed by: NURSE PRACTITIONER

## 2024-07-23 PROCEDURE — 80053 COMPREHEN METABOLIC PANEL: CPT | Performed by: NURSE PRACTITIONER

## 2024-07-23 PROCEDURE — 93005 ELECTROCARDIOGRAM TRACING: CPT

## 2024-07-23 PROCEDURE — 93010 ELECTROCARDIOGRAM REPORT: CPT | Mod: ,,, | Performed by: INTERNAL MEDICINE

## 2024-07-23 PROCEDURE — 63600175 PHARM REV CODE 636 W HCPCS: Performed by: STUDENT IN AN ORGANIZED HEALTH CARE EDUCATION/TRAINING PROGRAM

## 2024-07-23 PROCEDURE — 96366 THER/PROPH/DIAG IV INF ADDON: CPT

## 2024-07-23 PROCEDURE — 83735 ASSAY OF MAGNESIUM: CPT | Performed by: NURSE PRACTITIONER

## 2024-07-23 PROCEDURE — 96368 THER/DIAG CONCURRENT INF: CPT

## 2024-07-23 PROCEDURE — 99285 EMERGENCY DEPT VISIT HI MDM: CPT | Mod: 25

## 2024-07-23 PROCEDURE — 96365 THER/PROPH/DIAG IV INF INIT: CPT

## 2024-07-23 RX ORDER — POTASSIUM CHLORIDE 7.45 MG/ML
10 INJECTION INTRAVENOUS
Status: DISCONTINUED | OUTPATIENT
Start: 2024-07-23 | End: 2024-07-24 | Stop reason: HOSPADM

## 2024-07-23 RX ORDER — HYDROXYZINE PAMOATE 25 MG/1
25 CAPSULE ORAL EVERY 8 HOURS PRN
Qty: 20 CAPSULE | Refills: 0 | Status: SHIPPED | OUTPATIENT
Start: 2024-07-23 | End: 2024-08-02

## 2024-07-23 RX ORDER — MAGNESIUM SULFATE HEPTAHYDRATE 40 MG/ML
2 INJECTION, SOLUTION INTRAVENOUS
Status: COMPLETED | OUTPATIENT
Start: 2024-07-23 | End: 2024-07-23

## 2024-07-23 RX ADMIN — POTASSIUM CHLORIDE 10 MEQ: 7.46 INJECTION, SOLUTION INTRAVENOUS at 08:07

## 2024-07-23 RX ADMIN — MAGNESIUM SULFATE HEPTAHYDRATE 2 G: 40 INJECTION, SOLUTION INTRAVENOUS at 08:07

## 2024-07-23 RX ADMIN — POTASSIUM CHLORIDE 10 MEQ: 7.46 INJECTION, SOLUTION INTRAVENOUS at 09:07

## 2024-07-23 RX ADMIN — SODIUM CHLORIDE, POTASSIUM CHLORIDE, SODIUM LACTATE AND CALCIUM CHLORIDE 1000 ML: 600; 310; 30; 20 INJECTION, SOLUTION INTRAVENOUS at 10:07

## 2024-07-23 RX ADMIN — POTASSIUM CHLORIDE 10 MEQ: 7.46 INJECTION, SOLUTION INTRAVENOUS at 10:07

## 2024-07-23 NOTE — TELEPHONE ENCOUNTER
"Spoke with pts  today  Wanting an update on referral  I did advise that they will reach out to him to get pt scheduled  Pts  Elias then began to question why pt needs to be seen by a psych dr and why Dr Clarke can not just give pt an anxiety med  I did advise to Elias that in pts best interest that Dr Clarke would like for her to see psych to determine exactly where pts anxiety is originating from instead of just giving her medication   At this point pts daughter Zohreh got on the phone stating that her mom needs medication and that she is talking to her dead parents who have been dead for over 20 years. Also that she is wanting to go to the [employer] and stated THAT IS WHY SHE NEEDS THE MEDICINE and that is where her anxiety is coming from.   I did readvise that Dr Clarke would like for pt to be evaluated by psych to determine where the anxiety is coming from   Once again daughter Zohreh interrupted me stating "Well that is not necessary and I think Dr Clarke is NOT doing a good job"  Pts abby got back on the phone and stated "That was my daughter and we think this referral is unnecessary"   I advised that this is Dr Clarke's recommendation moving forward and that they have the option to go with it or decline.  Elias said "Okay" and hung up    "

## 2024-07-23 NOTE — FIRST PROVIDER EVALUATION
Medical screening examination initiated.  I have conducted a focused provider triage encounter, findings are as follows:    Brief history of present illness:  80 y/o female who presents with  from home for anxiety vs more confused and acting odd vs alzheimer    Vitals:    07/23/24 1621   BP: 139/84   Pulse: 90   Resp: 20   Temp: 98.4 °F (36.9 °C)   SpO2: 98%   Weight: 68 kg (150 lb)       Pertinent physical exam:  alert, not oriented to situation but does know where she is.     Brief workup plan:  labs, imaging    Preliminary workup initiated; this workup will be continued and followed by the physician or advanced practice provider that is assigned to the patient when roomed.

## 2024-07-23 NOTE — TELEPHONE ENCOUNTER
Spoke with pts  Elias  Stated that pt is having issues with her breathing and she is flinging her arms all over the place  Elias stated that he called hospice but they told him that pt would need a referral  I did advise given pts symptoms that she needs to be evaluated immediately  Pts  agrees   I did advise for him to call the ambulance to have pt brought to hospital to be evaluated   Pts  expressed understanding

## 2024-07-23 NOTE — TELEPHONE ENCOUNTER
Noted, I am concerned that her memory loss/dementia remains the cause of her symptoms and I would feel comfortable with patient seeing psychiatry/neuropsychiatry for medication management. I have had multiple negative experiences with patient family regarding medication for anxiety/depression, at times more hostile and I would like the advisement of a specialist in the area of psychiatry to assist with her mood changes. If patient/family disagree and would prefer a 2nd opinion that is also okay and in their right and we are supportive of this as well. I did place the referral to Dr. Bains previously and they should contact patient within 2 weeks of placing referral to discuss future appointment time if they are agreeable.

## 2024-07-24 NOTE — DISCHARGE INSTRUCTIONS
Follow-up with primary care provider.  Please call [phone] for an appointment    Follow-up with Neurology.  See list of attached neurologist    Contact information of [facility]  Address: [address], [place] [address]  Hours: Open 24 hours  Phone: [phone]    Your CT scan showed possible meningioma.  You will need outpatient MRI.    You may use Vistaril as needed for anxiety.  It was medications not functioning you may require additional medications.  Please see list of psychiatrist listed below.  Continue taking all other medications as prescribed.      Return to the emergency department if any new or worsening symptoms, chest pain, shortness of breath, nausea, vomiting, fever, or any other concerns.    Agency:  Contact Information:  Population Served:  Insurance Accepted:    [facility] [address], [place] [address]  [phone] Adolescents  Medicaid only    [facility] [address] [place] [address]  [phone] Adolescents Medicaid only    [facility] [place] [address]  [place] [id number] [phone] Adolescents  Adults Private insurance only   [facility] [address] [place] [address]  [phone] Adolescents  Adults Medicare  Private insurance    [facility] [address] [place] [address] [place] [address]  [phone] Adults-Inpatient Medicaid  Medicare  Private Insurance  Sliding Scale (Uninsured)    [facility] [address]  [place] [address]  [phone] Adolescents  Adults Medicaid  Medicare  Private Insurance    [facility] [address]  [place] [address]  [phone] Adolescents  Medicaid  Private Insurance   [facility]/ [phone] Adolescents, adults Medicaid, Medicare, most commercial insurances and self pay   [facility] [address]  [place] [address]  [phone] Adolescents  Adults Medicaid only   (except [place] [facility])    [facility] [address], [place] [address]  [phone] Adolescents  Medicaid only    [facility] [address] [place] [address]  [phone] (outpatient)     [address] [place] [address]  [phone] (Inpatient)     [address] [place] [address]  [phone] (outpatient)  Adults Medicaid  Medicare  Private Insurance   [facility] [address] [place] [address]  [phone] Adolescents  Medicaid only    [facility] [address] [place] [address]  [phone] Adolescents   Adults [place] Scale (Uninsured)    [facility]  [address] [place] [address]  [phone] (Inpatient)     [address] [place] [address]  [phone] (Outpatient)     [address] [place] [address]  [phone] (Outpatient)     [address] [place] [address]  [phone] (Outpatient)  Adults Medicaid  Medicare  Private Insurance  Sliding Scale (Uninsured)    [facility] [address] [facility] [place] [address]  [phone] Adolescents  Adults Medicaid  Private Insurance    [facility] [address] [facility] [place] [address]  [phone] Adolescents  Adults Medicaid only    [facility] [address] [place] [address]  [phone]    [address] [place] [address]  [phone]    [address] [place], [place] [address] [phone] Adolescents  Adults Medicaid  Medicare  Private Insurance  Sliding Scale (Uninsured)    [facility] [address] [place] [address]  [phone] Adolescents  Adults Medicaid  Private Insurance   [facility] [address]  [place] [address]  [phone] Adolescents  Adults Medicaid   [facility]  [address] [facility] [address] [facility] [address]  [place] [address]  [phone] Adolescents  Adults Medicaid   [facility]  [address] [place] [address]  [phone] (Inpatient/outpatient)     [address] [address]  [phone] (Inpatient/outpatient)  Adults Medicaid  Medicare  Private Insurance  [facility] [address] [place] [address]  [phone] Adolescents  Adults Medicaid   Private Insurance   [facility]  [address]  [place] [address]  [phone]    [address]  [place] [address]  [phone]    [address] [address]  [place] [address]  [phone] Adolescents  Adults   Medicaid only    [facility]  [address] [facility] [address]  [place] [address]  [phone] Adolescents  Medicaid  Private Insurance    [facility] [address] [place] [address]  [phone]    [address] [facility]  [place] [address]  [phone]    [address] [place] [address]  [phone] Adolescents  Adults Medicaid only   [facility]  [address], [facility] [address]  [place] [address]  [phone]    [address] [place] [address]  [place] [address]  [phone] Adolescents   Adults Medicaid  Medicare  Private Insurance    [facility] [address]  [place] [address]  [phone] Adults Medicaid  Medicare  Private Insurance   [facility] [address] [place] [address]  [phone] Adolescents  Adults  Medicaid   Medicare    [facility] [address] [facility] [place] [address]  [phone] Adolescents  Adults Medicaid only    [facility]  [address] [facility] [place] [address]  [phone] Adolescents  Adults Medicaid  Medicare  Sliding scale (Uninsured)    [facility] [address] [place] [address]  [place] [address]  [phone]  Adolescents  Adults Medicaid  Medicare  Private Insurance  [facility] [address] [place] [address]  [phone] Adolescents   Adults Private Insurance

## 2024-07-24 NOTE — ED PROVIDER NOTES
"Encounter Date: 2024    SCRIBE #1 NOTE: I, Darrion Bobobert, am scribing for, and in the presence of,  Herminio Ramos MD. I have scribed the following portions of the note - the EKG reading. Other sections scribed: HPI, ROS, PE.       History     Chief Complaint   Patient presents with    Anxiety     Family reports anxiety/panic attack today. Family states they would like eval for possible hospice. Hx dementia.      Patient is a 78 y/o female with a hx of alzheimers, HTN, DM, CKD, and HLD presents to the ED due to anxiety beginning today. Pt's  present at bedside states the pt had an episode of heavy breathing and "fist tightening" earlier today. He called EMS and they said that the pt may be having an anxiety attack. He notes the symptoms have currently resolved. He also states the pt has recently been looking for  relatives. Pt did have a fall 2 weeks ago. Pt not on BT.  Pt ambulatory with walker. Pt has not had a fever. Pt is on namenda per .     Hx and ROS limited due to dementia    The history is provided by the patient, medical records and the spouse. History limited by: dementia. No  was used.     Review of patient's allergies indicates:  No Known Allergies  Past Medical History:   Diagnosis Date    Blindness of left eye     Constipation due to pain medication 2013    Formatting of this note might be different from the original. After surgery    Dementia 10/06/2022    [place] (hard of hearing)     Hyperlipidemia associated with type 2 diabetes mellitus 2013    Osteoporosis     Overactive bladder 10/06/2022    Stage 3 chronic kidney disease due to type 2 diabetes mellitus 10/06/2022    Type 2 diabetes with complication 10/06/2022     Past Surgical History:   Procedure Laterality Date    TOTAL KNEE ARTHROPLASTY Right      No family history on file.  Social History     Tobacco Use    Smoking status: Never    Smokeless tobacco: Never   Substance Use Topics "    Alcohol use: Not Currently    Drug use: Never     Review of Systems   Unable to perform ROS: Dementia       Physical Exam     Initial Vitals [07/23/24 1621]   BP Pulse Resp Temp SpO2   139/84 90 20 98.4 °F (36.9 °C) 98 %      MAP       --         Physical Exam    Nursing note and vitals reviewed.  Constitutional: She appears well-developed and well-nourished.   HENT:   Head: Normocephalic and atraumatic.   Eyes: EOM are normal. Pupils are equal, round, and reactive to light.   Neck:   Normal range of motion.  Cardiovascular:  Normal rate, regular rhythm, normal heart sounds and intact distal pulses.           No murmur heard.  Pulmonary/Chest: Breath sounds normal. No respiratory distress. She has no wheezes. She has no rales.   Abdominal: Abdomen is soft. She exhibits no distension. There is no abdominal tenderness. There is no rebound.   Musculoskeletal:         General: No tenderness or edema. Normal range of motion.      Cervical back: Normal range of motion.     Neurological: She is alert.   Oriented to person.  Not oriented to place and time   Skin: Skin is warm and dry. Capillary refill takes less than 2 seconds. No rash noted. No erythema.         ED Course   Procedures  Labs Reviewed   CBC WITH DIFFERENTIAL - Abnormal       Result Value    WBC 11.31      RBC 5.21      Hgb 16.1 (*)     Hct 47.8 (*)     MCV 91.7      MCH 30.9      MCHC 33.7      RDW 14.1      Platelet 256      MPV 12.3 (*)     Neut % 71.6      Lymph % 17.6      Mono % 8.8      Eos % 0.8      Basophil % 0.5      Lymph # 1.99      Neut # 8.09      Mono # 1.00      Eos # 0.09      Baso # 0.06      IG# 0.08 (*)     IG% 0.7      NRBC% 0.0     COMPREHENSIVE METABOLIC PANEL - Abnormal    Sodium 141      Potassium 2.9 (*)     Chloride 107      CO2 23      Glucose 124 (*)     Blood Urea Nitrogen 12.0      Creatinine 0.84      Calcium 9.9      Protein Total 6.8      Albumin 4.0      Globulin 2.8      Albumin/Globulin Ratio 1.4      Bilirubin Total  1.1      ALP 68      ALT 14      AST 22      eGFR >60      Anion Gap 11.0      BUN/Creatinine Ratio 14     MAGNESIUM - Normal    Magnesium Level 2.00       EKG Readings: (Independently Interpreted)   Initial Reading: No STEMI. Rhythm: Normal Sinus Rhythm. Heart Rate: 75. Ectopy: No Ectopy. Conduction: Normal. ST Segments: Normal ST Segments. T Waves: Normal. Axis: Normal. Clinical Impression: Normal Sinus Rhythm   Done at 2038     ECG Results              EKG 12-lead (Final result)        Collection Time Result Time QRS Duration OHS QTC Calculation    07/23/24 20:38:08 07/23/24 21:27:22 84 431                     Final result by Interface, Lab In [facility] (07/23/24 21:27:32)                   Narrative:    Test Reason : F41.9,    Vent. Rate : 075 BPM     Atrial Rate : 075 BPM     P-R Int : 160 ms          QRS Dur : 084 ms      QT Int : 386 ms       P-R-T Axes : 046 069 053 degrees     QTc Int : 431 ms    Normal sinus rhythm  Normal ECG  When compared with ECG of 06-JUN-2024 17:50,  No significant change was found  Confirmed by Su Huggins MD ([id number]) on 7/23/2024 9:27:21 PM    Referred By: [email or web address]   SELF           Confirmed By:Su Huggins MD                                     EKG 12-lead (Final result)  Result time 07/29/24 15:13:32      Final result by Unknown User (07/29/24 15:13:32)                                      Imaging Results              CT Head Without Contrast (Final result)  Result time 07/23/24 20:29:43   Notes recorded by Nurse Tisha on 7/24/2024 at 3:09 PM CDT  I spoke with patient's , Mr. Griffin. He told me that the doctor at the Ed last night discussed the results in detail with him and they decided not to go through with an MRI. I did offer an appointment with Dr. Clarke to follow up and evaluate as well as arrange MRI at that time if they would like. Patient declined and said he would like to just keep their scheduled virtual visit in November with Dr. Clarke. I also asked if   "Nara's office had reached out to him. He said no, that he had also called their office "about 20 times" and no one had answered or returned his phone call so he will be looking for another psych. I offered to request to send referral elsewhere. He declined that offer and stated he would find a psych himself.   Notes recorded by Jose Miguel Clarke on 7/24/2024 at 2:03 PM CDT  Please call patient spouse, patient was seen in ED yesterday for suspected anxiety attack, work up included CT head which did show a meningioma in the brain which is a small tumor on the lining of the brain. The ED physician and the radiologist did advise for patient to complete an MRI of the brain; would they like to set up a follow up visit with me to arrange for them? I am still recommended that we do have a psychiatrist work with us to help us determine best course of treatment of her anxiety given her dementia/confusion as well and we have placed that referral, please f/u with patient to find out if they have heard from Dr. Bains's office. If not, please update me about what you find out with Amy.      Final result by Amrit Merrill MD (07/23/24 20:29:43)                   Impression:      1.  Left high frontal extra-axial parasagittal location mass is favored to represent a meningioma.  Second partially calcified mass about the left tentorium and 3rd mass abutting the left proximal aspect the medulla may again represent meningiomas.  Please further confirm with MRI with and without contrast..    2.  No acute intracranial findings identified.    3.  Right mastoiditis changes      Electronically signed by: Amrit Merrill  Date:    07/23/2024  Time:    20:29               Narrative:    EXAMINATION:  CT HEAD WITHOUT CONTRAST    CLINICAL HISTORY:  Mental status change, unknown cause;    TECHNIQUE:  Sequential axial images were performed of the brain without contrast.    Dose product length of 869 mGycm. Automated exposure control was utilized to " minimize radiation dose.    COMPARISON:  None available.    FINDINGS:  There is left anterior high frontal in the parasagittal location extra-axial appearing density measuring 1.4 x 1.7 x 1.6 cm with right eccentric calcification and could represent a meningioma on image 45 series 2 and image 32 series 9.  There is a second partially calcified mass about left tentorium measuring 1.6 x 0.8 cm and is also suggested to represent a meningioma on image 15 series 2.  There is 3rd calcified density along the left aspect of the proximal medulla measuring 9 x 6 mm and may again represent a meningioma on image 4 series 2.  There is no intracranial mass effect, midline shift, hydrocephalus or hemorrhage. There is no sulcal effacement or low attenuation changes to suggest recent large vessel territory infarction. Chronic appearing periventricular and subcortical white matter low attenuation changes are present and are consistent with chronic microangiopathic ischemia. The ventricular system and sulcal markings prominence is consistent with atrophy. There is no acute extra axial fluid collection.  Right frontal scalp small benign meningioma.  Visualized paranasal sinuses are clear without mucosal thickening, polypoidal abnormality or air-fluid levels.  There is loss of pneumatization of the right mastoid air cell opacification fluid.                                       X-Ray Chest 1 View (Final result)  Result time 07/23/24 17:09:48      Final result by Eileen Donovan MD (07/23/24 17:09:48)                   Impression:      No acute cardiopulmonary abnormality.      Electronically signed by: Eileen Donovan  Date:    07/23/2024  Time:    17:09               Narrative:    EXAMINATION:  XR CHEST 1 VIEW    CLINICAL HISTORY:  breathing fast;    TECHNIQUE:  Single frontal view of the chest was performed.    COMPARISON:  06/06/2024    FINDINGS:  LINES AND TUBES: None    MEDIASTINUM AND JAKOB: The cardiac silhouette is  normal. Retrocardiac opacity with air-fluid level compatible with moderate hiatal hernia.    LUNGS: No lobar consolidation. No edema.    PLEURA:No pleural effusion. No pneumothorax.    OTHER: No acute osseous abnormality.                                       Medications   magnesium sulfate 2g in water 50mL IVPB (premix) (0 g Intravenous Stopped 7/23/24 2200)   lactated ringers bolus 1,000 mL (0 mLs Intravenous Stopped 7/23/24 2312)     Medical Decision Making  Differential diagnosis includes but is not limited to:  Judging by the patient's chief complaint and pertinent history, the patient has the following possible differential diagnoses, including but not limited to the following.  Some of these are deemed to be lower likelihood and some more likely based on my physical exam and history combined with possible lab work and/or imaging studies.   Please see the pertinent studies, and refer to the HPI.  Some of these diagnoses will take further evaluation to fully rule out, perhaps as an outpatient and the patient was encouraged to follow up when discharged for more comprehensive evaluation.    Metabolic abnormality, intoxication, toxic ingestion, CVA, infection, structural (SAH, ICH, trauma, neoplastic), seizure/postictal, polypharmacy       Patient is a 79-year-old female who presents to emergency department for anxiety, history of worsening dementia.  Patient also enquiring about possible hospice.  CT scan of the head obtained showing 2 lesions that concerning for possible meningioma.  Labs obtained.  Lab work as noted.  EKG without ST elevation.  Patient noted to be hypokalemic.  Given magnesium and some potassium.  Given IV fluids.  Discussed obtaining MRI of the brain with and without contrast for further evaluation of these lesions.  Patient's  requesting discharge.  States will like to go home.  Will follow-up with the primary care provider for consideration for hospice.  Given details for hospice as  "well as list of psychiatrists given to the patient's .  On reassessment no acute distress.  Patient resting comfortably.  Discussed strict return precautions.  Discussed to return if any new or worsening symptoms, chest pain, shortness of breath, any difficulties with getting in with hospice or any other symptoms.   verbalized understanding and agreed to plan.    Problems Addressed:  Anxiety: acute illness or injury that poses a threat to life or bodily functions  Dementia, unspecified dementia severity, unspecified dementia type, unspecified whether behavioral, psychotic, or mood disturbance or anxiety: acute illness or injury that poses a threat to life or bodily functions  Hypokalemia: acute illness or injury that poses a threat to life or bodily functions  Meningioma: acute illness or injury that poses a threat to life or bodily functions    Amount and/or Complexity of Data Reviewed  Independent Historian: spouse     Details: Pt's  present at bedside states the pt had an episode of heavy breathing and "fist tightening" earlier today. He called EMS and they said that the pt may be having an anxiety attack. He notes the symptoms have currently resolved. He also states the pt has recently been looking for  relatives. Pt did have a fall 2 weeks ago. Pt not on BT.  Pt ambulatory with walker. Pt has not had a fever. Pt is on namenda per .  Labs: ordered.  Radiology: ordered and independent interpretation performed.  ECG/medicine tests: ordered and independent interpretation performed.     Details: EKG Readings: (Independently Interpreted)   Initial Reading: No STEMI. Rhythm: Normal Sinus Rhythm. Heart Rate: 75. Ectopy: No Ectopy. Conduction: Normal. ST Segments: Normal ST Segments. T Waves: Normal. Axis: Normal. Clinical Impression: Normal Sinus Rhythm   Done at        Risk  Prescription drug management.  Parenteral controlled substances.  Decision regarding " "hospitalization.  Decision not to resuscitate or to de-escalate care because of poor prognosis.  Diagnosis or treatment significantly limited by social determinants of health.            Scribe Attestation:   Scribe #1: I performed the above scribed service and the documentation accurately describes the services I performed. I attest to the accuracy of the note.    Attending Attestation:           Physician Attestation for Scribe:  Physician Attestation Statement for Scribe #1: I, Herminio Ramos MD, reviewed documentation, as scribed by Darrion Patricio in my presence, and it is both accurate and complete.             ED Course as of 24 1337   Tue 2024   2306 Reassessed the patient.  Patient resting comfortably.   requesting discharge.  Discussed all results.  Offered MRI.  Patient and family will follow-up outpatient.  Contact information for hospice provided to the patient.  List of psychiatrist also given to the patient. [RP]      ED Course User Index  [RP] Herminio Ramos MD               Medical Decision Making:   History:   I obtained history from: someone other than patient.       <> Summary of History: Pt's  present at bedside states the pt had an episode of heavy breathing and "fist tightening" earlier today. He called EMS and they said that the pt may be having an anxiety attack. He notes the symptoms have currently resolved. He also states the pt has recently been looking for  relatives. Pt did have a fall 2 weeks ago. Pt not on BT.  Pt ambulatory with walker. Pt has not had a fever. Pt is on namenda per .  Independently Interpreted Test(s):   I have ordered and independently interpreted EKG Reading(s) - see summary below       <> Summary of EKG Reading(s): EKG Readings: (Independently Interpreted)   Initial Reading: No STEMI. Rhythm: Normal Sinus Rhythm. Heart Rate: 75. Ectopy: No Ectopy. Conduction: Normal. ST Segments: Normal ST Segments. T Waves: Normal. Axis: " Normal. Clinical Impression: Normal Sinus Rhythm   Done at      Clinical Tests:   Lab Tests: Ordered  Radiological Study: Ordered  Medical Tests: Ordered             Clinical Impression:  Final diagnoses:  [F41.9] Anxiety (Primary)  [E87.6] Hypokalemia  [F03.90] Dementia, unspecified dementia severity, unspecified dementia type, unspecified whether behavioral, psychotic, or mood disturbance or anxiety  [D32.9] Meningioma          ED Disposition Condition    Discharge Stable          ED Prescriptions       Medication Sig Dispense Start Date End Date Auth. Provider    hydrOXYzine pamoate (VISTARIL) 25 MG Cap () Take 1 capsule (25 mg total) by mouth every 8 (eight) hours as needed (anxiety). 20 capsule 2024 Herminio Ramos MD          Follow-up Information       Follow up With Specialties Details Why Contact Info    Primary Care  Call in 1 day  Please call [phone] for a primary care provider.    Juan Hernandez MD Neurology, Sleep Medicine   [address] [place] [address] [address]  [place] [address]  [phone]      Rufino Pastor MD Neurology   [address] [facility] [address]  [facility] [address]  [phone]      Mecca Gonzalez MD Neurology   [address] [place] [address] [address]  [address]  [place] [id number] [phone]               Herminio Ramos MD  24 0458

## 2024-08-05 ENCOUNTER — TELEPHONE (OUTPATIENT)
Dept: FAMILY MEDICINE | Facility: CLINIC | Age: 79
End: 2024-08-05
Payer: MEDICARE